# Patient Record
Sex: MALE | Race: OTHER | URBAN - METROPOLITAN AREA
[De-identification: names, ages, dates, MRNs, and addresses within clinical notes are randomized per-mention and may not be internally consistent; named-entity substitution may affect disease eponyms.]

---

## 2023-04-02 ENCOUNTER — EMERGENCY (EMERGENCY)
Facility: HOSPITAL | Age: 23
LOS: 0 days | Discharge: HOME | End: 2023-04-02
Attending: EMERGENCY MEDICINE
Payer: MEDICAID

## 2023-04-02 VITALS
WEIGHT: 176.81 LBS | HEART RATE: 67 BPM | RESPIRATION RATE: 18 BRPM | TEMPERATURE: 99 F | OXYGEN SATURATION: 99 % | SYSTOLIC BLOOD PRESSURE: 124 MMHG | DIASTOLIC BLOOD PRESSURE: 59 MMHG

## 2023-04-02 PROCEDURE — 71046 X-RAY EXAM CHEST 2 VIEWS: CPT | Mod: 26

## 2023-04-02 PROCEDURE — 99284 EMERGENCY DEPT VISIT MOD MDM: CPT

## 2023-04-02 PROCEDURE — 93010 ELECTROCARDIOGRAM REPORT: CPT

## 2023-04-02 PROCEDURE — 93005 ELECTROCARDIOGRAM TRACING: CPT

## 2023-04-02 PROCEDURE — 99283 EMERGENCY DEPT VISIT LOW MDM: CPT | Mod: 25

## 2023-04-02 PROCEDURE — 71046 X-RAY EXAM CHEST 2 VIEWS: CPT

## 2023-04-02 RX ORDER — IBUPROFEN 200 MG
600 TABLET ORAL ONCE
Refills: 0 | Status: COMPLETED | OUTPATIENT
Start: 2023-04-02 | End: 2023-04-02

## 2023-04-02 RX ADMIN — Medication 600 MILLIGRAM(S): at 14:02

## 2023-04-02 NOTE — ED PROVIDER NOTE - PHYSICAL EXAMINATION
_  Vital signs reviewed; ABCs intact  GENERAL: Well nourished, comfortable  SKIN: Warm, dry  HEAD & NECK: NCAT, supple neck  EYES: EOMI, PER B/L  ENT: MMM  CARD: RRR, S1, S2; no murmurs, no rubs, no gallops  RESP: Normal respiratory effort, CTAB, no rales, no wheezing  EXT: Pulses palpable distally; no edema; no calf tenderness; symmetrical calf circumferences  NEUROMSK: Grossly intact  PSYCH: AAOx3, cooperative, appropriate

## 2023-04-02 NOTE — ED PROVIDER NOTE - OBJECTIVE STATEMENT
Patient is a 22-year-old man without any reported, significant past medical history, presenting for chest pain for several months. Pain is on the left anterior chest next to the sternum, occurring in for the past several months, somewhat worse over the last few weeks. Described as may be pressure. Non-exertional; no SOB, diaphoresis, nausea/vomiting, radiation to jaw/left shoulder/arms, reproducible with palpation; no family history of significant cardiac disease or sudden unexplained deaths. Non-pleuritic; no recent surgery, personal or family h/o VTE, hemoptysis, malignancy, or hormone use. Not tearing/ripping, radiation to back, FND. No retching, chronic alcohol use, or recent endoscopy. No fever, IVDU.

## 2023-04-02 NOTE — ED PROVIDER NOTE - NSFOLLOWUPINSTRUCTIONS_ED_ALL_ED_FT
For pain, you may take the following over-the-counter medication(s):  - Acetaminophen (Tylenol, Midol) 650-1000 mg up to every 4-6 hours, as needed (max 4000mg/24hrs), AND/OR  - Ibuprofen (Motrin, Advil) 400-800 mg up to every 6-8 hours, as needed (max 3200mg/24hrs) You were evaluated in the Emergency Department today, and your visit did not reveal anything immediately life-threatening.    However, it is important that you follow-up with your PRIMARY CARE PHYSICIAN within 3-5 days.    For pain, you may take the following over-the-counter medication(s):  - Acetaminophen (Tylenol, Midol) 650-1000 mg up to every 4-6 hours, as needed (max 4000mg/24hrs), AND/OR  - Ibuprofen (Motrin, Advil) 400-800 mg up to every 6-8 hours, as needed (max 3200mg/24hrs)    ---------------------------------------------------------------------------------------------------    Chest Pain    Chest pain can be caused by many different conditions which may or may not be dangerous. Causes include heartburn, lung infections, heart attack, blood clot in lungs, skin infections, strain or damage to muscle, cartilage, or bones, etc. In addition to a history and physical examination, an electrocardiogram (ECG) or other lab tests may have been performed to determine the cause of your chest pain. Follow up with your primary care provider or with a cardiologist as instructed.     SEEK IMMEDIATE MEDICAL CARE IF YOU HAVE ANY OF THE FOLLOWING SYMPTOMS: worsening chest pain, coughing up blood, unexplained back/neck/jaw pain, severe abdominal pain, dizziness or lightheadedness, fainting, shortness of breath, sweaty or clammy skin, vomiting, or racing heart beat. These symptoms may represent a serious problem that is an emergency. Do not wait to see if the symptoms will go away. Get medical help right away. Call 911 and do not drive yourself to the hospital.

## 2023-04-02 NOTE — ED PROVIDER NOTE - ATTENDING CONTRIBUTION TO CARE
Musculoskeletal chest wall pain.  For several months.  Worse over the last few weeks.  Nonexertional.  No associated shortness of breath or diaphoresis.  No fever.  No vomiting.    Exam as noted.  Vital stable.  Lungs clear.  Abdomen soft.  No calf tenderness.    Chest x-ray ordered and reviewed by me.  NSAIDs.  DC with follow-up.

## 2023-04-02 NOTE — ED PROVIDER NOTE - PATIENT PORTAL LINK FT
You can access the FollowMyHealth Patient Portal offered by [facility] by registering at the following website: [email or web address]. By joining [employer]’s FollowMyHealth portal, you will also be able to view your health information using other applications (apps) compatible with our system.

## 2023-04-03 DIAGNOSIS — R07.89 OTHER CHEST PAIN: ICD-10-CM

## 2024-08-29 ENCOUNTER — INPATIENT (INPATIENT)
Facility: HOSPITAL | Age: 24
LOS: 7 days | Discharge: HOME HEALTH | DRG: 204 | End: 2024-09-06
Attending: STUDENT IN AN ORGANIZED HEALTH CARE EDUCATION/TRAINING PROGRAM | Admitting: STUDENT IN AN ORGANIZED HEALTH CARE EDUCATION/TRAINING PROGRAM
Payer: MEDICAID

## 2024-08-29 VITALS
RESPIRATION RATE: 18 BRPM | HEIGHT: 70 IN | SYSTOLIC BLOOD PRESSURE: 91 MMHG | TEMPERATURE: 98 F | DIASTOLIC BLOOD PRESSURE: 57 MMHG | OXYGEN SATURATION: 98 % | HEART RATE: 48 BPM | WEIGHT: 138.01 LBS

## 2024-08-29 DIAGNOSIS — Z82.41 FAMILY HISTORY OF SUDDEN CARDIAC DEATH: ICD-10-CM

## 2024-08-29 DIAGNOSIS — I51.89 OTHER ILL-DEFINED HEART DISEASES: ICD-10-CM

## 2024-08-29 DIAGNOSIS — E83.42 HYPOMAGNESEMIA: ICD-10-CM

## 2024-08-29 DIAGNOSIS — R00.1 BRADYCARDIA, UNSPECIFIED: ICD-10-CM

## 2024-08-29 DIAGNOSIS — Z20.89 CONTACT WITH AND (SUSPECTED) EXPOSURE TO OTHER COMMUNICABLE DISEASES: ICD-10-CM

## 2024-08-29 DIAGNOSIS — E86.0 DEHYDRATION: ICD-10-CM

## 2024-08-29 DIAGNOSIS — I34.81 NONRHEUMATIC MITRAL (VALVE) ANNULUS CALCIFICATION: ICD-10-CM

## 2024-08-29 DIAGNOSIS — R55 SYNCOPE AND COLLAPSE: ICD-10-CM

## 2024-08-29 DIAGNOSIS — I42.2 OTHER HYPERTROPHIC CARDIOMYOPATHY: ICD-10-CM

## 2024-08-29 DIAGNOSIS — I34.1 NONRHEUMATIC MITRAL (VALVE) PROLAPSE: ICD-10-CM

## 2024-08-29 DIAGNOSIS — I42.8 OTHER CARDIOMYOPATHIES: ICD-10-CM

## 2024-08-29 DIAGNOSIS — I95.9 HYPOTENSION, UNSPECIFIED: ICD-10-CM

## 2024-08-29 DIAGNOSIS — Z86.74 PERSONAL HISTORY OF SUDDEN CARDIAC ARREST: ICD-10-CM

## 2024-08-29 DIAGNOSIS — M62.838 OTHER MUSCLE SPASM: ICD-10-CM

## 2024-08-29 LAB
ALBUMIN SERPL ELPH-MCNC: 4.5 G/DL — SIGNIFICANT CHANGE UP (ref 3.5–5.2)
ALP SERPL-CCNC: 108 U/L — SIGNIFICANT CHANGE UP (ref 30–115)
ALT FLD-CCNC: 29 U/L — SIGNIFICANT CHANGE UP (ref 0–41)
ANION GAP SERPL CALC-SCNC: 9 MMOL/L — SIGNIFICANT CHANGE UP (ref 7–14)
AST SERPL-CCNC: 17 U/L — SIGNIFICANT CHANGE UP (ref 0–41)
BASOPHILS # BLD AUTO: 0.03 K/UL — SIGNIFICANT CHANGE UP (ref 0–0.2)
BASOPHILS NFR BLD AUTO: 0.5 % — SIGNIFICANT CHANGE UP (ref 0–1)
BILIRUB SERPL-MCNC: 0.5 MG/DL — SIGNIFICANT CHANGE UP (ref 0.2–1.2)
BUN SERPL-MCNC: 12 MG/DL — SIGNIFICANT CHANGE UP (ref 10–20)
CALCIUM SERPL-MCNC: 9.8 MG/DL — SIGNIFICANT CHANGE UP (ref 8.4–10.4)
CHLORIDE SERPL-SCNC: 103 MMOL/L — SIGNIFICANT CHANGE UP (ref 98–110)
CO2 SERPL-SCNC: 30 MMOL/L — SIGNIFICANT CHANGE UP (ref 17–32)
CREAT SERPL-MCNC: 1 MG/DL — SIGNIFICANT CHANGE UP (ref 0.7–1.5)
EGFR: 109 ML/MIN/1.73M2 — SIGNIFICANT CHANGE UP
EOSINOPHIL # BLD AUTO: 0.14 K/UL — SIGNIFICANT CHANGE UP (ref 0–0.7)
EOSINOPHIL NFR BLD AUTO: 2.4 % — SIGNIFICANT CHANGE UP (ref 0–8)
GLUCOSE SERPL-MCNC: 101 MG/DL — HIGH (ref 70–99)
HCT VFR BLD CALC: 39.3 % — LOW (ref 42–52)
HGB BLD-MCNC: 12.6 G/DL — LOW (ref 14–18)
IMM GRANULOCYTES NFR BLD AUTO: 0.2 % — SIGNIFICANT CHANGE UP (ref 0.1–0.3)
LYMPHOCYTES # BLD AUTO: 1.4 K/UL — SIGNIFICANT CHANGE UP (ref 1.2–3.4)
LYMPHOCYTES # BLD AUTO: 24.3 % — SIGNIFICANT CHANGE UP (ref 20.5–51.1)
MAGNESIUM SERPL-MCNC: 1.8 MG/DL — SIGNIFICANT CHANGE UP (ref 1.8–2.4)
MCHC RBC-ENTMCNC: 30.7 PG — SIGNIFICANT CHANGE UP (ref 27–31)
MCHC RBC-ENTMCNC: 32.1 G/DL — SIGNIFICANT CHANGE UP (ref 32–37)
MCV RBC AUTO: 95.6 FL — HIGH (ref 80–94)
MONOCYTES # BLD AUTO: 0.4 K/UL — SIGNIFICANT CHANGE UP (ref 0.1–0.6)
MONOCYTES NFR BLD AUTO: 6.9 % — SIGNIFICANT CHANGE UP (ref 1.7–9.3)
NEUTROPHILS # BLD AUTO: 3.78 K/UL — SIGNIFICANT CHANGE UP (ref 1.4–6.5)
NEUTROPHILS NFR BLD AUTO: 65.7 % — SIGNIFICANT CHANGE UP (ref 42.2–75.2)
NRBC # BLD: 0 /100 WBCS — SIGNIFICANT CHANGE UP (ref 0–0)
NT-PROBNP SERPL-SCNC: 86 PG/ML — SIGNIFICANT CHANGE UP (ref 0–300)
PLATELET # BLD AUTO: 243 K/UL — SIGNIFICANT CHANGE UP (ref 130–400)
PMV BLD: 9.8 FL — SIGNIFICANT CHANGE UP (ref 7.4–10.4)
POTASSIUM SERPL-MCNC: 4.5 MMOL/L — SIGNIFICANT CHANGE UP (ref 3.5–5)
POTASSIUM SERPL-SCNC: 4.5 MMOL/L — SIGNIFICANT CHANGE UP (ref 3.5–5)
PROT SERPL-MCNC: 7 G/DL — SIGNIFICANT CHANGE UP (ref 6–8)
RBC # BLD: 4.11 M/UL — LOW (ref 4.7–6.1)
RBC # FLD: 13.2 % — SIGNIFICANT CHANGE UP (ref 11.5–14.5)
SODIUM SERPL-SCNC: 142 MMOL/L — SIGNIFICANT CHANGE UP (ref 135–146)
TROPONIN T, HIGH SENSITIVITY RESULT: 11 NG/L — SIGNIFICANT CHANGE UP (ref 6–21)
TROPONIN T, HIGH SENSITIVITY RESULT: 13 NG/L — SIGNIFICANT CHANGE UP (ref 6–21)
WBC # BLD: 5.76 K/UL — SIGNIFICANT CHANGE UP (ref 4.8–10.8)
WBC # FLD AUTO: 5.76 K/UL — SIGNIFICANT CHANGE UP (ref 4.8–10.8)

## 2024-08-29 PROCEDURE — 99291 CRITICAL CARE FIRST HOUR: CPT

## 2024-08-29 PROCEDURE — 95714 VEEG EA 12-26 HR UNMNTR: CPT

## 2024-08-29 PROCEDURE — 93621 COMP EP EVL L PAC&REC C SINS: CPT

## 2024-08-29 PROCEDURE — 85027 COMPLETE CBC AUTOMATED: CPT

## 2024-08-29 PROCEDURE — C1777: CPT

## 2024-08-29 PROCEDURE — 86901 BLOOD TYPING SEROLOGIC RH(D): CPT

## 2024-08-29 PROCEDURE — 95819 EEG AWAKE AND ASLEEP: CPT

## 2024-08-29 PROCEDURE — 36415 COLL VENOUS BLD VENIPUNCTURE: CPT

## 2024-08-29 PROCEDURE — 84443 ASSAY THYROID STIM HORMONE: CPT

## 2024-08-29 PROCEDURE — 93620 COMP EP EVL R AT VEN PAC&REC: CPT

## 2024-08-29 PROCEDURE — 97162 PT EVAL MOD COMPLEX 30 MIN: CPT | Mod: GP

## 2024-08-29 PROCEDURE — 97110 THERAPEUTIC EXERCISES: CPT | Mod: GP

## 2024-08-29 PROCEDURE — 95700 EEG CONT REC W/VID EEG TECH: CPT

## 2024-08-29 PROCEDURE — 71045 X-RAY EXAM CHEST 1 VIEW: CPT

## 2024-08-29 PROCEDURE — C1721: CPT

## 2024-08-29 PROCEDURE — 75561 CARDIAC MRI FOR MORPH W/DYE: CPT

## 2024-08-29 PROCEDURE — 83735 ASSAY OF MAGNESIUM: CPT

## 2024-08-29 PROCEDURE — 97116 GAIT TRAINING THERAPY: CPT | Mod: GP

## 2024-08-29 PROCEDURE — 33249 INSJ/RPLCMT DEFIB W/LEAD(S): CPT

## 2024-08-29 PROCEDURE — 93010 ELECTROCARDIOGRAM REPORT: CPT

## 2024-08-29 PROCEDURE — 80307 DRUG TEST PRSMV CHEM ANLYZR: CPT

## 2024-08-29 PROCEDURE — 86850 RBC ANTIBODY SCREEN: CPT

## 2024-08-29 PROCEDURE — 93005 ELECTROCARDIOGRAM TRACING: CPT

## 2024-08-29 PROCEDURE — C1889: CPT

## 2024-08-29 PROCEDURE — C1769: CPT

## 2024-08-29 PROCEDURE — 93287 PERI-PX DEVICE EVAL & PRGR: CPT

## 2024-08-29 PROCEDURE — 86900 BLOOD TYPING SEROLOGIC ABO: CPT

## 2024-08-29 PROCEDURE — C1730: CPT

## 2024-08-29 PROCEDURE — C1766: CPT

## 2024-08-29 PROCEDURE — 71275 CT ANGIOGRAPHY CHEST: CPT | Mod: 26,MC

## 2024-08-29 PROCEDURE — 85610 PROTHROMBIN TIME: CPT

## 2024-08-29 PROCEDURE — 71046 X-RAY EXAM CHEST 2 VIEWS: CPT

## 2024-08-29 PROCEDURE — C1892: CPT

## 2024-08-29 PROCEDURE — 97530 THERAPEUTIC ACTIVITIES: CPT | Mod: GP

## 2024-08-29 PROCEDURE — C1894: CPT

## 2024-08-29 PROCEDURE — A9579: CPT

## 2024-08-29 PROCEDURE — 93623 PRGRMD STIMJ&PACG IV RX NFS: CPT

## 2024-08-29 PROCEDURE — 85730 THROMBOPLASTIN TIME PARTIAL: CPT

## 2024-08-29 PROCEDURE — 71046 X-RAY EXAM CHEST 2 VIEWS: CPT | Mod: 26

## 2024-08-29 PROCEDURE — 84439 ASSAY OF FREE THYROXINE: CPT

## 2024-08-29 PROCEDURE — 97166 OT EVAL MOD COMPLEX 45 MIN: CPT | Mod: GO

## 2024-08-29 PROCEDURE — 80048 BASIC METABOLIC PNL TOTAL CA: CPT

## 2024-08-29 PROCEDURE — 93306 TTE W/DOPPLER COMPLETE: CPT

## 2024-08-29 PROCEDURE — 85025 COMPLETE CBC W/AUTO DIFF WBC: CPT

## 2024-08-29 RX ORDER — SODIUM CHLORIDE 9 MG/ML
1000 INJECTION INTRAMUSCULAR; INTRAVENOUS; SUBCUTANEOUS ONCE
Refills: 0 | Status: COMPLETED | OUTPATIENT
Start: 2024-08-29 | End: 2024-08-29

## 2024-08-29 RX ORDER — METOPROLOL TARTRATE 100 MG/1
25 TABLET ORAL DAILY
Refills: 0 | Status: DISCONTINUED | OUTPATIENT
Start: 2024-08-29 | End: 2024-08-30

## 2024-08-29 RX ADMIN — SODIUM CHLORIDE 1000 MILLILITER(S): 9 INJECTION INTRAMUSCULAR; INTRAVENOUS; SUBCUTANEOUS at 22:59

## 2024-08-29 RX ADMIN — SODIUM CHLORIDE 1000 MILLILITER(S): 9 INJECTION INTRAMUSCULAR; INTRAVENOUS; SUBCUTANEOUS at 21:24

## 2024-08-29 NOTE — ED PROVIDER NOTE - OBJECTIVE STATEMENT
24-year-old male past medical history of HCOM?,  Cardiac arrest in June 2024 presents to the ED for evaluation of syncope.  Patient with syncopal episode at home patient was sitting down and then remembers waking up with blurry vision.  Witnessed by mother patient called patient as he was falling forward off of his chair.  Patient was unconscious for approximately 1 minute came to was back to baseline immediately.  Some shaking was noted however no postictal period, no tongue biting no urinary incontinence.  Patient has had episodes like these over the past several years.  Admits to family history of sudden cardiac death father passed away in his early 30s.  Following with cardiologist Dr. Becker in [place].  There was talk of AICD placement versus LifeVest however patient currently does not have either.  Patient currently without any complaints denies any recent illness no cough, fever, chills, nausea, vomiting, headache, chest pain, shortness of breath, abdominal pain.

## 2024-08-29 NOTE — ED ADULT NURSE NOTE - CHIEF COMPLAINT QUOTE
pt biba from home after c/o blurry vision and dizziness followed by a syncopal episode followed by seizure like activity witnessed by mom -pt has previously had a seizure in the past few yrs ago - hx of MI  in june and taken off ventilator and discharged to home two weeks ago.  RAC 18g - 350mlof  500 ml bolus administered by ems. FS  99

## 2024-08-29 NOTE — ED ADULT TRIAGE NOTE - CHIEF COMPLAINT QUOTE
pt biba from home after syncopal episode followed by seizure like activity witnessed by mom -pt has previously had a seizure in the past few yrs ago - hx of MI  in june and taken off ventilator and discharged to home two weeks ago.  RAC 18g - 350mlof  500 ml bolus administered by ems pt biba from home after c/o blurry vision and dizziness followed by a syncopal episode followed by seizure like activity witnessed by mom -pt has previously had a seizure in the past few yrs ago - hx of MI  in june and taken off ventilator and discharged to home two weeks ago.  RAC 18g - 350mlof  500 ml bolus administered by ems. FS  99

## 2024-08-29 NOTE — ED PROVIDER NOTE - CRITICAL CARE ATTENDING CONTRIBUTION TO CARE
I personally saw the patient. ANIYAH De Leon and I provided critical care for a total of  35 minutes. I provided a substantive portion of the care and the majority of the critical care time.

## 2024-08-29 NOTE — ED PROVIDER NOTE - CLINICAL SUMMARY MEDICAL DECISION MAKING FREE TEXT BOX
Patient with past medical history of cardiac arrest presents with syncopal episode family history of sudden cardiac arrest patient does not have AICD or LifeVest no chest pain no shortness of breath    Discussed with cardiology will admit for cardiology    Independently interpretted by Rudy Olson DO:  XR interpretation: No cardiopulmonary disease,   EKG interpretation: no MIKEL, NSR    Appropriate medications for patient's presenting complaints were ordered and effects were reassessed.  Patient's external records were reviewed. Additional history was obtained from.    Escalation to admission/observation was considered.  At this time, patient requires inpatient hospitalization .

## 2024-08-30 ENCOUNTER — RESULT REVIEW (OUTPATIENT)
Age: 24
End: 2024-08-30

## 2024-08-30 LAB
ANION GAP SERPL CALC-SCNC: 13 MMOL/L — SIGNIFICANT CHANGE UP (ref 7–14)
APTT BLD: 31.5 SEC — SIGNIFICANT CHANGE UP (ref 27–39.2)
BASOPHILS # BLD AUTO: 0.03 K/UL — SIGNIFICANT CHANGE UP (ref 0–0.2)
BASOPHILS NFR BLD AUTO: 0.4 % — SIGNIFICANT CHANGE UP (ref 0–1)
BUN SERPL-MCNC: 11 MG/DL — SIGNIFICANT CHANGE UP (ref 10–20)
CALCIUM SERPL-MCNC: 9.3 MG/DL — SIGNIFICANT CHANGE UP (ref 8.4–10.4)
CHLORIDE SERPL-SCNC: 102 MMOL/L — SIGNIFICANT CHANGE UP (ref 98–110)
CO2 SERPL-SCNC: 25 MMOL/L — SIGNIFICANT CHANGE UP (ref 17–32)
CREAT SERPL-MCNC: 0.9 MG/DL — SIGNIFICANT CHANGE UP (ref 0.7–1.5)
EGFR: 124 ML/MIN/1.73M2 — SIGNIFICANT CHANGE UP
EOSINOPHIL # BLD AUTO: 0.18 K/UL — SIGNIFICANT CHANGE UP (ref 0–0.7)
EOSINOPHIL NFR BLD AUTO: 2.5 % — SIGNIFICANT CHANGE UP (ref 0–8)
GLUCOSE SERPL-MCNC: 79 MG/DL — SIGNIFICANT CHANGE UP (ref 70–99)
HCT VFR BLD CALC: 35.9 % — LOW (ref 42–52)
HGB BLD-MCNC: 11.6 G/DL — LOW (ref 14–18)
IMM GRANULOCYTES NFR BLD AUTO: 0.3 % — SIGNIFICANT CHANGE UP (ref 0.1–0.3)
INR BLD: 1.1 RATIO — SIGNIFICANT CHANGE UP (ref 0.65–1.3)
LYMPHOCYTES # BLD AUTO: 2.05 K/UL — SIGNIFICANT CHANGE UP (ref 1.2–3.4)
LYMPHOCYTES # BLD AUTO: 28.3 % — SIGNIFICANT CHANGE UP (ref 20.5–51.1)
MAGNESIUM SERPL-MCNC: 1.7 MG/DL — LOW (ref 1.8–2.4)
MCHC RBC-ENTMCNC: 30.4 PG — SIGNIFICANT CHANGE UP (ref 27–31)
MCHC RBC-ENTMCNC: 32.3 G/DL — SIGNIFICANT CHANGE UP (ref 32–37)
MCV RBC AUTO: 94.2 FL — HIGH (ref 80–94)
MONOCYTES # BLD AUTO: 0.53 K/UL — SIGNIFICANT CHANGE UP (ref 0.1–0.6)
MONOCYTES NFR BLD AUTO: 7.3 % — SIGNIFICANT CHANGE UP (ref 1.7–9.3)
NEUTROPHILS # BLD AUTO: 4.44 K/UL — SIGNIFICANT CHANGE UP (ref 1.4–6.5)
NEUTROPHILS NFR BLD AUTO: 61.2 % — SIGNIFICANT CHANGE UP (ref 42.2–75.2)
NRBC # BLD: 0 /100 WBCS — SIGNIFICANT CHANGE UP (ref 0–0)
PLATELET # BLD AUTO: 234 K/UL — SIGNIFICANT CHANGE UP (ref 130–400)
PMV BLD: 10 FL — SIGNIFICANT CHANGE UP (ref 7.4–10.4)
POTASSIUM SERPL-MCNC: 4.1 MMOL/L — SIGNIFICANT CHANGE UP (ref 3.5–5)
POTASSIUM SERPL-SCNC: 4.1 MMOL/L — SIGNIFICANT CHANGE UP (ref 3.5–5)
PROTHROM AB SERPL-ACNC: 12.6 SEC — SIGNIFICANT CHANGE UP (ref 9.95–12.87)
RBC # BLD: 3.81 M/UL — LOW (ref 4.7–6.1)
RBC # FLD: 13.1 % — SIGNIFICANT CHANGE UP (ref 11.5–14.5)
SODIUM SERPL-SCNC: 140 MMOL/L — SIGNIFICANT CHANGE UP (ref 135–146)
WBC # BLD: 7.25 K/UL — SIGNIFICANT CHANGE UP (ref 4.8–10.8)
WBC # FLD AUTO: 7.25 K/UL — SIGNIFICANT CHANGE UP (ref 4.8–10.8)

## 2024-08-30 PROCEDURE — 93010 ELECTROCARDIOGRAM REPORT: CPT

## 2024-08-30 PROCEDURE — 99223 1ST HOSP IP/OBS HIGH 75: CPT

## 2024-08-30 PROCEDURE — 93306 TTE W/DOPPLER COMPLETE: CPT | Mod: 26

## 2024-08-30 RX ORDER — AMANTADINE HCL 100 MG
100 CAPSULE ORAL
Refills: 0 | Status: DISCONTINUED | OUTPATIENT
Start: 2024-08-30 | End: 2024-08-30

## 2024-08-30 RX ORDER — SODIUM CHLORIDE 9 MG/ML
1000 INJECTION INTRAMUSCULAR; INTRAVENOUS; SUBCUTANEOUS
Refills: 0 | Status: DISCONTINUED | OUTPATIENT
Start: 2024-08-30 | End: 2024-08-31

## 2024-08-30 RX ORDER — METOPROLOL TARTRATE 100 MG/1
25 TABLET ORAL
Refills: 0 | Status: DISCONTINUED | OUTPATIENT
Start: 2024-08-30 | End: 2024-08-30

## 2024-08-30 RX ORDER — GABAPENTIN 100 MG
300 CAPSULE ORAL THREE TIMES A DAY
Refills: 0 | Status: DISCONTINUED | OUTPATIENT
Start: 2024-08-30 | End: 2024-08-30

## 2024-08-30 RX ORDER — ASPIRIN 81 MG
81 TABLET, DELAYED RELEASE (ENTERIC COATED) ORAL DAILY
Refills: 0 | Status: DISCONTINUED | OUTPATIENT
Start: 2024-08-30 | End: 2024-09-06

## 2024-08-30 RX ORDER — ASPIRIN 81 MG
0 TABLET, DELAYED RELEASE (ENTERIC COATED) ORAL
Refills: 0 | DISCHARGE

## 2024-08-30 RX ORDER — ACETAMINOPHEN 325 MG/1
650 TABLET ORAL ONCE
Refills: 0 | Status: COMPLETED | OUTPATIENT
Start: 2024-08-30 | End: 2024-08-30

## 2024-08-30 RX ORDER — ACETAMINOPHEN 325 MG/1
650 TABLET ORAL EVERY 6 HOURS
Refills: 0 | Status: DISCONTINUED | OUTPATIENT
Start: 2024-08-30 | End: 2024-09-06

## 2024-08-30 RX ORDER — HEPARIN SODIUM,BOVINE 1000/ML
5000 VIAL (ML) INJECTION EVERY 12 HOURS
Refills: 0 | Status: DISCONTINUED | OUTPATIENT
Start: 2024-08-30 | End: 2024-08-31

## 2024-08-30 RX ADMIN — SODIUM CHLORIDE 75 MILLILITER(S): 9 INJECTION INTRAMUSCULAR; INTRAVENOUS; SUBCUTANEOUS at 05:58

## 2024-08-30 RX ADMIN — Medication 300 MILLIGRAM(S): at 06:13

## 2024-08-30 RX ADMIN — ACETAMINOPHEN 650 MILLIGRAM(S): 325 TABLET ORAL at 06:13

## 2024-08-30 RX ADMIN — Medication 100 MILLIGRAM(S): at 06:13

## 2024-08-30 RX ADMIN — Medication 81 MILLIGRAM(S): at 11:33

## 2024-08-30 RX ADMIN — Medication 5000 UNIT(S): at 17:12

## 2024-08-30 RX ADMIN — METOPROLOL TARTRATE 25 MILLIGRAM(S): 100 TABLET ORAL at 01:04

## 2024-08-30 RX ADMIN — ACETAMINOPHEN 650 MILLIGRAM(S): 325 TABLET ORAL at 06:43

## 2024-08-30 RX ADMIN — Medication 25 GRAM(S): at 13:25

## 2024-08-30 NOTE — H&P ADULT - ASSESSMENT
24-year-old male past medical history of HCOM?,  Cardiac arrest in June 2024 prompting around a month hospital stay  at [facility] s/p being on ventillar discharged recently from LTAC in [place], presents to the ED for evaluation of syncope today, denies any post ictal or prodromal symptoms, hypotensive on arrival.  s/p 500 cc with EMS , and BP on arrival  91/57 mm, and he received 1 L NS in the ED with improvement in BP to 105/70. During admission, patient resting comfortably in stretcher in ED, at baseline mental status, denies any chest pain, palpitations, lightheadedness, dizziness, blurry vision. Patient admitted to cardiac tele for further management of Syncope.       #Syncope- likely due to Dehydration, ?cardiogenic  #?HOCM  - Monitor on tele   - s/p 1 L NS   - avoid dehydration, continue NS 75 cc/hr   - obtain prior records from [facility] in the AM  - TTE ordered   - Pt takes Toprol 25 mg bid at home, currently HR 53-55, re-evaluate based on HR in the AM , pt received one dose in the ED   - continue Aspirin 81 mg daily   - consider EP consult     #Recent cardiac arrest   - on amantidine 100 mg bid and gabapentin 300 mg tid as per mother     #DVT ppx  - Early ambulation               24-year-old male past medical history of Cardiac arrest in June 2024 prompting around a month hospital stay  at [facility] s/p being on ventillar discharged recently from LTAC in [place], presents to the ED for evaluation of syncope today, denies any post ictal or prodromal symptoms, hypotensive on arrival.  s/p 500 cc with EMS , and BP on arrival  91/57 mm, and he received 1 L NS in the ED with improvement in BP to 105/70. During admission, patient resting comfortably in stretcher in ED, at baseline mental status, denies any chest pain, palpitations, lightheadedness, dizziness, blurry vision. Patient admitted to cardiac tele for further management of Syncope.       #Syncope- likely due to Dehydration, ?cardiogenic  #?HOCM  - Monitor on tele   - s/p 1 L NS   - avoid dehydration, continue NS 75 cc/hr   - obtain prior records from [facility] in the AM  - TTE ordered   - Pt takes Toprol 25 mg bid at home, currently HR 53-55, re-evaluate based on HR in the AM , pt received one dose in the ED   - continue Aspirin 81 mg daily   - consider EP consult     #Recent cardiac arrest   - on amantidine 100 mg bid and gabapentin 300 mg tid as per mother     #DVT ppx  - Early ambulation

## 2024-08-30 NOTE — H&P ADULT - NS ATTEND AMEND GEN_ALL_CORE FT
24yoM with FHx of father who  in his sleep at age 33 (no autopsy or genetic testing) and PMHx of cardiac arrest requiring ~30 minutes of chest compressions and month-long hospitalization at [facility] who presents with syncope.     Per the patient's mother, patient's father had no known medical conditions. On the day of his death, he was feeling unwell and going to the bathroom frequently overnight. He called out sick, which he normally does not do. In the morning, she got up and sent her son/patient off to school. When she returned to the bedroom, her /patient's father was blue and cold in bed. Per the patient's mother, she does not know of any cardiac conditions or syncope/arrest in her late 's family.     Mr. Diaz has had 3 syncopal episodes in his life. Once in high school when he came out of a sauna. Another "on his birthday", when he was urinating while standing and his mother found him unconscious on the bathroom floor. And yesterday's episode. Patient was sitting down with a glass of water, when he passed out and spilled the water. His mother caught him falling in his chair, saying he was diaphoretic, clammy, and pale. Episode lasted a few seconds, up to a minute.     As per above, patient with recent history of cardiac arrest and now follows with Dr. Becker in [place]. Reportedly had "normal plumbing," an EP study, and a LifeVest was recommended but not covered by insurance.     On my review of the TTE, there is right ventricular dilitation and hypertrophy, no other significant findings. The ostium of his left main is located at the LCC.    Plan:   - On ASA 81 mg daily, unclear reason but will continue until OSH obtained  - Discontinue gabapentin and amantadine, which was prescribed for post-arrest muscle spasms while at [facility]  - CMR for infiltrative disease and rule out of ARVD  - Sending a Release of Medical Records to [facility] and other institution where patient has been cared for  - Trop: 13 --> 11  - ECG 3024: Sinus bradycardia with LVH, high voltage QRS in the lateral precordial leads, normal QTc  - TTE 24: LVEF 62%, normal LV size and wall thickness, RV is mildly enlarged, RVH, no valvular abnormalities  - Will not order CCTA at this time because LM is visible off LCC, awaiting OSH records  - Consult EP for ILR and LifeVest versus ICD placement

## 2024-08-30 NOTE — PROGRESS NOTE ADULT - SUBJECTIVE AND OBJECTIVE BOX
Chief complaint: Patient is a 24y old  Male who presents with a chief complaint of Syncope (30 Aug 2024 01:53)    Interval history: Patient seen and examined this AM. Patient tired on exam. Accompanied by mother.    Review of systems: A complete 10-point review of systems was obtained and is negative except as stated in the interval history.    Vitals:  T(F): 98.1, Max: 98.5 (08-29 @ 23:04)  HR: 64 (48 - 81)  BP: 115/71 (91/57 - 119/60)  RR: 18 (18 - 18)  SpO2: 100% (98% - 100%)    Ins & outs:     08-30 @ 07:01  -  08-30 @ 10:26  --------------------------------------------------------  IN: 300 mL / OUT: 0 mL / NET: 300 mL      Weight trend:  Weight (kg): 65.3 (08-30)    Physical exam:  General: No apparent distress  HEENT: Anicteric sclera. Moist mucous membranes. JVP *** cm.   Cardiac: Regular rate and rhythm. No murmurs, rubs, or gallops.   Vascular: Symmetric radial pulses. Dorsalis pedis pulses palpable.   Respiratory: Normal effort. Clear to ascultation.   Abdomen: Soft, nontender. Audible bowel sounds.   Extremities: Warm with no edema. No cyanosis or clubbing.   Skin: Warm and dry. No rash.   Neurologic: Grossly normal motor function.   Psychiatric: Oriented to person, place, and time.     Data reviewed:  - Telemetry: NS 49-67bpm    - ECG (8/30/24):   Diagnosis Line Sinus bradycardia  Voltage criteria for left ventricular hypertrophy  ST elevation, consider early repolarization  Abnormal ECG    - TTE: Pending    - Chest x-ray (8/29/24):   Impression:  No radiographic evidence of acute cardiopulmonary disease.      - Labs:                        11.6   7.25  )-----------( 234      ( 30 Aug 2024 07:39 )             35.9     08-29    142  |  103  |  12  ----------------------------<  101<H>  4.5   |  30  |  1.0    Ca    9.8      29 Aug 2024 18:50  Mg     1.8     08-29    TPro  7.0  /  Alb  4.5  /  TBili  0.5  /  DBili  x   /  AST  17  /  ALT  29  /  AlkPhos  108  08-29    PT/INR - ( 30 Aug 2024 07:39 )   PT: 12.60 sec;   INR: 1.10 ratio         PTT - ( 30 Aug 2024 07:39 )  PTT:31.5 sec            Urinalysis Basic - ( 29 Aug 2024 18:50 )    Color: x / Appearance: x / SG: x / pH: x  Gluc: 101 mg/dL / Ketone: x  / Bili: x / Urobili: x   Blood: x / Protein: x / Nitrite: x   Leuk Esterase: x / RBC: x / WBC x   Sq Epi: x / Non Sq Epi: x / Bacteria: x        Medications:  amantadine 100 milliGRAM(s) Oral two times a day  aspirin enteric coated 81 milliGRAM(s) Oral daily  gabapentin 300 milliGRAM(s) Oral three times a day  heparin   Injectable 5000 Unit(s) SubCutaneous every 12 hours    Drips:  sodium chloride 0.9%. 1000 milliLiter(s) (75 mL/Hr) IV Continuous <Continuous>    PRN:     Allergies    No Known Allergies    Intolerances

## 2024-08-30 NOTE — PROGRESS NOTE ADULT - ASSESSMENT
Assessment:   24-year-old male past medical history of HCOM?,  Cardiac arrest in June 2024 prompting around a month hospital stay  at [facility] s/p being on ventillar discharged recently from LTAC in [place], presents to the ED for evaluation of syncope today, denies any post ictal or prodromal symptoms, hypotensive on arrival.  s/p 500 cc with EMS , and BP on arrival  91/57 mm, and he received 1 L NS in the ED with improvement in BP to 105/70. During admission, patient resting comfortably in stretcher in ED, at baseline mental status, denies any chest pain, palpitations, lightheadedness, dizziness, blurry vision. Patient admitted to cardiac tele for further work-up of syncope.       Problems discussed and associated plan:  #Syncope- likely due to Dehydration, ?cardiogenic  #?HOCM (+ FHx)  - Monitor on tele   - s/p 1 L NS   - avoid dehydration, continue NS 75 cc/hr   - TTE pending official read  - Pt takes Toprol 25 mg bid at home, currently HR 53-55, re-evaluate based on HR in the AM , pt received one dose in the ED   - continue Aspirin 81 mg daily   - consider EP consult     #Muscle spasms  - on amantidine 100 mg bid and gabapentin 300 mg tid for less than a month as per mother     #DVT ppx  - Early ambulation     Please contact me with any questions or concerns at [phone]. Assessment:   24-year-old male past medical history of  Cardiac arrest in June 2024 prompting around a month hospital stay  at [facility] s/p being on ventillar discharged recently from LTAC in [place], presents to the ED for evaluation of syncope today, denies any post ictal or prodromal symptoms, hypotensive on arrival.  s/p 500 cc with EMS , and BP on arrival  91/57 mm, and he received 1 L NS in the ED with improvement in BP to 105/70. During admission, patient resting comfortably in stretcher in ED, at baseline mental status, denies any chest pain, palpitations, lightheadedness, dizziness, blurry vision. Patient admitted to cardiac tele for further work-up of syncope.       Problems discussed and associated plan:  #Syncope- likely due to Dehydration, ?cardiogenic  #?HOCM (+ FHx)  - Monitor on tele   - s/p 1 L NS   - avoid dehydration, continue NS 75 cc/hr   - TTE pending official read  - Pt takes Toprol 25 mg bid at home, currently HR 53-55, re-evaluate based on HR in the AM , pt received one dose in the ED   - continue Aspirin 81 mg daily   - consider EP consult     #Muscle spasms  - on amantidine 100 mg bid and gabapentin 300 mg tid for less than a month as per mother     #DVT ppx  - Early ambulation     Please contact me with any questions or concerns at [phone]. Assessment:   24-year-old male past medical history of  Cardiac arrest in June 2024 prompting around a month hospital stay  at [facility] s/p being on ventillar discharged recently from LTAC in [place], presents to the ED for evaluation of syncope today, denies any post ictal or prodromal symptoms, hypotensive on arrival.  s/p 500 cc with EMS , and BP on arrival  91/57 mm, and he received 1 L NS in the ED with improvement in BP to 105/70. During admission, patient resting comfortably in stretcher in ED, at baseline mental status, denies any chest pain, palpitations, lightheadedness, dizziness, blurry vision. Patient admitted to cardiac tele for further work-up of syncope.       Problems discussed and associated plan:  #Syncope  - h/o cardiac arrest  - Monitor on tele   - s/p 1 L NS   - TTE pending official read  - Pt takes Toprol 25 mg bid at home, currently HR 50s  - continue Aspirin 81 mg daily   - EP consult: Wearable cardiac defibrillator, ILR, and eventual genetic testing   - Cardiac MRI pending to assess for infiltrative disease  - Obtaining records from [facility], [facility], and [facility] [place]  - F/u am ekgs    #Muscle spasms  - DCed amantidine 100 mg bid and gabapentin 300 mg tid   - monitor    #DVT ppx  - Early ambulation     Please contact me with any questions or concerns at [phone].

## 2024-08-30 NOTE — H&P ADULT - HISTORY OF PRESENT ILLNESS
24-year-old male past medical history of HCOM?,  Cardiac arrest in June 2024 prompting around a month hospital stay  at [facility] s/p being on ventillar discharged recently from [facility] in [place], presents to the ED for evaluation of syncope.    Patient accompanied by mother. Patient was at an [facility] center when he was walking around and wanted to sit somewhere, found a chair,  and asked for water. As he sat down, his mother reports he had lost consciousness and was falling forward off his chair, and caught him from falling. LOC around  few seconds, and as per mother patient appeared clammy, diaphoretic, pale and upon waking up patient was at baseline, but did c/o blurry vision. Denies any prodromal or post ictal symptoms, but as per mother she saw some shaking. Denies any tongue biting, urinary incontinence.   As per mother, patient had around a month hospital stay at [facility] post cardiac arrest, where he was told his plumbing of heart is normal , but had problems with wiring. Also reports having an EEG done showed no seizures. Patient since discharge has been following  with cardiologist Dr. Becker in [place], and since then he reports having an abnormal EP study, and was recommended a life vest but due to insurance issues he was not able to get it. Subsequently, he underwent another EP study which as per mother was "normal".   Patient during admission seen in the ED, at baseline mental status,  denies any chest pain, SOB, fever, chills, cough, sickness, n/v/d, head ache, pain, leg swelling/pain, hx of seizures.   BP on ED arrival  91/57 mm, and he received 1 L NS in the ED. prior to admission. Patient further admitted to cardiology for further management. BP on admission 105/70.       24-year-old male past medical history of Cardiac arrest in June 2024 prompting around a month hospital stay  at [facility] s/p being on ventillar discharged recently from LTAC in [place], presents to the ED for evaluation of syncope.    Patient accompanied by mother. Patient was at an [facility] center when he was walking around and wanted to sit somewhere, found a chair,  and asked for water. As he sat down, his mother reports he had lost consciousness and was falling forward off his chair, and caught him from falling. LOC around  few seconds, and as per mother patient appeared clammy, diaphoretic, pale and upon waking up patient was at baseline, but did c/o blurry vision. Denies any prodromal or post ictal symptoms, but as per mother she saw some shaking. Denies any tongue biting, urinary incontinence.   As per mother, patient had around a month hospital stay at [facility] post cardiac arrest, where he was told his plumbing of heart is normal , but had problems with wiring. Also reports having an EEG done showed no seizures. Patient since discharge has been following  with cardiologist Dr. Becker in [place], and since then he reports having an abnormal EP study, and was recommended a life vest but due to insurance issues he was not able to get it. Subsequently, he underwent another EP study which as per mother was "normal".   Patient during admission seen in the ED, at baseline mental status,  denies any chest pain, SOB, fever, chills, cough, sickness, n/v/d, head ache, pain, leg swelling/pain, hx of seizures.   BP on ED arrival  91/57 mm, and he received 1 L NS in the ED. prior to admission. Patient further admitted to cardiology for further management. BP on admission 105/70.

## 2024-08-30 NOTE — H&P ADULT - NSHPLABSRESULTS_GEN_ALL_CORE
- Labs:                        12.6   5.76  )-----------( 243      ( 29 Aug 2024 18:50 )             39.3     08-29    142  |  103  |  12  ----------------------------<  101<H>  4.5   |  30  |  1.0    Ca    9.8      29 Aug 2024 18:50  Mg     1.8     08-29    TPro  7.0  /  Alb  4.5  /  TBili  0.5  /  DBili  x   /  AST  17  /  ALT  29  /  AlkPhos  108  08-29    LIVER FUNCTIONS - ( 29 Aug 2024 18:50 )  Alb: 4.5 g/dL / Pro: 7.0 g/dL / ALK PHOS: 108 U/L / ALT: 29 U/L / AST: 17 U/L / GGT: x                     Lactate Trend    Urinalysis Basic - ( 29 Aug 2024 18:50 )    Color: x / Appearance: x / SG: x / pH: x  Gluc: 101 mg/dL / Ketone: x  / Bili: x / Urobili: x   Blood: x / Protein: x / Nitrite: x   Leuk Esterase: x / RBC: x / WBC x   Sq Epi: x / Non Sq Epi: x / Bacteria: x

## 2024-08-30 NOTE — PATIENT PROFILE ADULT - FALL HARM RISK - HARM RISK INTERVENTIONS

## 2024-08-30 NOTE — H&P ADULT - NSHPPHYSICALEXAM_GEN_ALL_CORE
VITALS:   T(C): 36.9 (08-29-24 @ 23:04), Max: 36.9 (08-29-24 @ 17:36)  HR: 58 (08-29-24 @ 23:04) (48 - 58)  BP: 105/70 (08-29-24 @ 23:04) (91/57 - 105/70)  RR: 18 (08-29-24 @ 23:04) (18 - 18)  SpO2: 98% (08-29-24 @ 23:04) (98% - 98%)    GENERAL: NAD, lying in bed comfortably  HEAD:  Atraumatic, normocephalic  EYES: EOMI, PERRLA, conjunctiva and sclera clear  ENT: Moist mucous membranes  NECK: Supple, no JVD  HEART: Regular rate and rhythm, sinus bradycardia HR 53, no murmur   LUNGS: Unlabored respirations.  Clear to auscultation bilaterally, no crackles, wheezing, or rhonchi  ABDOMEN: Soft, nontender, nondistended, +BS  EXTREMITIES: 2+ peripheral pulses bilaterally. No clubbing, cyanosis, or edema  NERVOUS SYSTEM:  A&Ox3, no focal deficits   SKIN: No rashes or lesions

## 2024-08-31 LAB
ANION GAP SERPL CALC-SCNC: 10 MMOL/L — SIGNIFICANT CHANGE UP (ref 7–14)
BUN SERPL-MCNC: 9 MG/DL — LOW (ref 10–20)
CALCIUM SERPL-MCNC: 9.3 MG/DL — SIGNIFICANT CHANGE UP (ref 8.4–10.5)
CHLORIDE SERPL-SCNC: 104 MMOL/L — SIGNIFICANT CHANGE UP (ref 98–110)
CO2 SERPL-SCNC: 26 MMOL/L — SIGNIFICANT CHANGE UP (ref 17–32)
CREAT SERPL-MCNC: 0.8 MG/DL — SIGNIFICANT CHANGE UP (ref 0.7–1.5)
EGFR: 128 ML/MIN/1.73M2 — SIGNIFICANT CHANGE UP
GLUCOSE SERPL-MCNC: 87 MG/DL — SIGNIFICANT CHANGE UP (ref 70–99)
HCT VFR BLD CALC: 37.5 % — LOW (ref 42–52)
HGB BLD-MCNC: 12.5 G/DL — LOW (ref 14–18)
MAGNESIUM SERPL-MCNC: 1.9 MG/DL — SIGNIFICANT CHANGE UP (ref 1.8–2.4)
MCHC RBC-ENTMCNC: 30.8 PG — SIGNIFICANT CHANGE UP (ref 27–31)
MCHC RBC-ENTMCNC: 33.3 G/DL — SIGNIFICANT CHANGE UP (ref 32–37)
MCV RBC AUTO: 92.4 FL — SIGNIFICANT CHANGE UP (ref 80–94)
NRBC # BLD: 0 /100 WBCS — SIGNIFICANT CHANGE UP (ref 0–0)
PLATELET # BLD AUTO: 211 K/UL — SIGNIFICANT CHANGE UP (ref 130–400)
PMV BLD: 9.6 FL — SIGNIFICANT CHANGE UP (ref 7.4–10.4)
POTASSIUM SERPL-MCNC: 3.9 MMOL/L — SIGNIFICANT CHANGE UP (ref 3.5–5)
POTASSIUM SERPL-SCNC: 3.9 MMOL/L — SIGNIFICANT CHANGE UP (ref 3.5–5)
RBC # BLD: 4.06 M/UL — LOW (ref 4.7–6.1)
RBC # FLD: 12.9 % — SIGNIFICANT CHANGE UP (ref 11.5–14.5)
SODIUM SERPL-SCNC: 140 MMOL/L — SIGNIFICANT CHANGE UP (ref 135–146)
WBC # BLD: 6.32 K/UL — SIGNIFICANT CHANGE UP (ref 4.8–10.8)
WBC # FLD AUTO: 6.32 K/UL — SIGNIFICANT CHANGE UP (ref 4.8–10.8)

## 2024-08-31 PROCEDURE — 99254 IP/OBS CNSLTJ NEW/EST MOD 60: CPT

## 2024-08-31 PROCEDURE — 99233 SBSQ HOSP IP/OBS HIGH 50: CPT

## 2024-08-31 RX ORDER — HEPARIN SODIUM,BOVINE 1000/ML
5000 VIAL (ML) INJECTION EVERY 12 HOURS
Refills: 0 | Status: DISCONTINUED | OUTPATIENT
Start: 2024-08-31 | End: 2024-09-03

## 2024-08-31 RX ADMIN — Medication 5000 UNIT(S): at 05:35

## 2024-08-31 RX ADMIN — Medication 81 MILLIGRAM(S): at 11:17

## 2024-08-31 RX ADMIN — Medication 5000 UNIT(S): at 17:37

## 2024-08-31 NOTE — CONSULT NOTE ADULT - SUBJECTIVE AND OBJECTIVE BOX
Patient is a 24y old  Male who presents with a chief complaint of Syncope (31 Aug 2024 09:17)        HPI:   24-year-old male past medical history of Cardiac arrest in 2024 prompting around a month hospital stay  at [facility] s/p being on ventillar discharged recently from [facility] in [place], presents to the ED for evaluation of syncope.    Patient accompanied by mother. Patient was at an [facility] center when he was walking around and wanted to sit somewhere, found a chair,  and asked for water. As he sat down, his mother reports he had lost consciousness and was falling forward off his chair, and caught him from falling. LOC around  few seconds, and as per mother patient appeared clammy, diaphoretic, pale and upon waking up patient was at baseline, but did c/o blurry vision. Denies any prodromal or post ictal symptoms, but as per mother she saw some shaking. Denies any tongue biting, urinary incontinence.   As per mother, patient had around a month hospital stay at [facility] post cardiac arrest, where he was told his plumbing of heart is normal , but had problems with wiring. Also reports having an EEG done showed no seizures. Patient since discharge has been following  with cardiologist Dr. Becker in [place], and since then he reports having an abnormal EP study, and was recommended a life vest but due to insurance issues he was not able to get it. Subsequently, he underwent another EP study which as per mother was "normal".   Patient during admission seen in the ED, at baseline mental status,  denies any chest pain, SOB, fever, chills, cough, sickness, n/v/d, head ache, pain, leg swelling/pain, hx of seizures.   BP on ED arrival  91/57 mm, and he received 1 L NS in the ED. prior to admission. Patient further admitted to cardiology for further management. BP on admission 105/70.      (30 Aug 2024 01:53)      Electrophysiology:  24y Male    REVIEW OF SYSTEMS    [ ] A ten-point review of systems was otherwise negative except as noted.  [ ] Due to altered mental status/intubation, subjective information were not able to be obtained from the patient. History was obtained, to the extent possible, from review of the chart and collateral sources of information.      PAST MEDICAL & SURGICAL HISTORY:      Home Medications:  amantadine 100 mg oral tablet: 1 tab(s) orally 2 times a day (30 Aug 2024 01:48)  aspirin 81 mg oral delayed release tablet: 1 tab(s) orally once a day (30 Aug 2024 01:49)  gabapentin 300 mg oral tablet: 1 tab(s) orally 3 times a day (30 Aug 2024 01:50)  Multiple Vitamins oral tablet: 1 tab(s) orally once a day (30 Aug 2024 01:50)  Toprol-XL 25 mg oral tablet, extended release: 1 tab(s) orally 2 times a day (30 Aug 2024 01:51)      Allergies:  No Known Allergies      FAMILY HISTORY:      SOCIAL HISTORY:    CIGARETTES:  ALCOHOL:  MARIJUANA:  ILLICIT DRUGS:        PREVIOUS DIAGNOSTIC TESTING:      ECHO  FINDINGS:    NON-INVASIVE   FINDINGS:    CATHETERIZATION  FINDINGS:    ELECTROPHYSIOLOGY STUDY  FINDINGS:    CAROTIDS :  FINDINGS    VENOUS DUPLEX SCAN:  FINDINGS:    CHEST CT PULMONARY ANGIO with IV Contrast:  FINDINGS:      MEDICATIONS  (STANDING):  aspirin enteric coated 81 milliGRAM(s) Oral daily  heparin   Injectable 5000 Unit(s) SubCutaneous every 12 hours    MEDICATIONS  (PRN):  acetaminophen     Tablet .. 650 milliGRAM(s) Oral every 6 hours PRN Moderate Pain (4 - 6)      Vital Signs Last 24 Hrs  T(C): 36.6 (31 Aug 2024 07:39), Max: 37 (31 Aug 2024 00:30)  T(F): 97.9 (31 Aug 2024 07:39), Max: 98.6 (31 Aug 2024 00:30)  HR: 67 (31 Aug 2024 07:39) (62 - 68)  BP: 117/75 (31 Aug 2024 07:39) (107/67 - 137/80)  BP(mean): 91 (31 Aug 2024 07:39) (82 - 103)  RR: 18 (31 Aug 2024 07:39) (18 - 19)  SpO2: 96% (31 Aug 2024 07:39) (95% - 99%)    Parameters below as of 31 Aug 2024 07:39  Patient On (Oxygen Delivery Method): room air        PHYSICAL EXAM:    GENERAL: In no apparent distress, well nourished, and hydrated.  HEAD:  Atraumatic, Normocephalic  EYES: EOMI, PERRLA, conjunctiva and sclera clear  NECK: Supple and normal thyroid.  No JVD or carotid bruit.  Carotid pulse is 2+ bilaterally.  HEART: Regular rate and rhythm; No murmurs, rubs, or gallops.  PULMONARY: Clear to auscultation and perfusion.  No rales, wheezing, or rhonchi bilaterally.  ABDOMEN: Soft, Nontender, Nondistended; Bowel sounds present  EXTREMITIES:  2+ Peripheral Pulses, No clubbing, cyanosis, or edema  NEUROLOGICAL: Grossly nonfocal    I&O's Detail    30 Aug 2024 07:01  -  31 Aug 2024 07:00  --------------------------------------------------------  IN:    sodium chloride 0.9%: 450 mL  Total IN: 450 mL    OUT:    Voided (mL): 750 mL  Total OUT: 750 mL    Total NET: -300 mL      31 Aug 2024 07:01  -  31 Aug 2024 16:01  --------------------------------------------------------  IN:    Oral Fluid: 440 mL  Total IN: 440 mL    OUT:    Voided (mL): 1580 mL  Total OUT: 1580 mL    Total NET: -1140 mL        Daily     Daily     INTERPRETATION OF TELEMETRY:    EC Lead ECG:   Ventricular Rate 54 BPM    Atrial Rate 54 BPM    P-R Interval 132 ms    QRS Duration 104 ms    Q-T Interval 406 ms    QTC Calculation(Bazett) 385 ms    P Axis 59 degrees    R Axis 80 degrees    T Axis 58 degrees    Diagnosis Line Sinus bradycardia  Voltage criteria for left ventricular hypertrophy  ST elevation, consider early repolarization  Abnormal ECG    Confirmed by Nadia Zavala ([id number]) on 2024 8:59:23 AM (24 @ 05:05)  12 Lead ECG:   Ventricular Rate 48 BPM    Atrial Rate 48 BPM    P-R Interval 124 ms    QRS Duration 106 ms    Q-T Interval 424 ms    QTC Calculation(Bazett) 378 ms    P Axis 72 degrees    R Axis 90 degrees    T Axis 71 degrees    Diagnosis Line Sinus bradycardia  Rightward axis  Early repolarization  Borderline ECG    Confirmed by Omar Marino ([id number]) on 2024 8:54:23 PM (24 @ 18:21)        LABS:                        12.5   6.32  )-----------( 211      ( 31 Aug 2024 05:42 )             37.5         140  |  104  |  9<L>  ----------------------------<  87  3.9   |  26  |  0.8    Ca    9.3      31 Aug 2024 05:42  Mg     1.9         TPro  7.0  /  Alb  4.5  /  TBili  0.5  /  DBili  x   /  AST  17  /  ALT  29  /  AlkPhos  108      PT/INR - ( 30 Aug 2024 07:39 )   PT: 12.60 sec;   INR: 1.10 ratio         PTT - ( 30 Aug 2024 07:39 )  PTT:31.5 sec      BNPPro-Brain Natriuretic Peptide: 86 pg/mL ( @ 18:50)              RADIOLOGY & ADDITIONAL STUDIES:       Patient is a 24y old  Male who presents with a chief complaint of Syncope (31 Aug 2024 09:17)        HPI:   24-year-old male past medical history of Cardiac arrest in 2024 prompting around a month hospital stay  at [facility] s/p being on ventillar discharged recently from LT in [place], presents to the ED for evaluation of syncope.    Patient accompanied by mother. Patient was at an [facility] [place] center when he was walking around and wanted to sit somewhere, found a chair,  and asked for water. As he sat down, his mother reports he had lost consciousness and was falling forward off his chair, and caught him from falling. LOC around  few seconds, and as per mother patient appeared clammy, diaphoretic, pale and upon waking up patient was at baseline, but did c/o blurry vision. Denies any prodromal or post ictal symptoms, but as per mother she saw some shaking. Denies any tongue biting, urinary incontinence.   As per mother, patient had around a month hospital stay at [facility] post cardiac arrest, where he was told his plumbing of heart is normal , but had problems with wiring. Also reports having an EEG done showed no seizures. Patient since discharge has been following  with cardiologist Dr. Becker in [place], and since then he reports having an abnormal EP study, and was recommended a life vest but due to insurance issues he was not able to get it. Subsequently, he underwent another EP study which as per mother was "normal".   Patient during admission seen in the ED, at baseline mental status,  denies any chest pain, SOB, fever, chills, cough, sickness, n/v/d, head ache, pain, leg swelling/pain, hx of seizures.   BP on ED arrival  91/57 mm, and he received 1 L NS in the ED. prior to admission. Patient further admitted to cardiology for further management. BP on admission 105/70.      (30 Aug 2024 01:53)      Electrophysiology:  24y Male with family h/o SCD, father deat at 34yo, no significant medical history until 2024 when suffered cardiac arrest at home, was admitted to [facility], complicated hospital stay, eventually discharged to LT. While in rehab was noted abnormalities on routine EKG and admitted to [facility], where he underwent extensive cardiac eval including cardiac MRI, EP study and ablation, was recommended wearable cardiac defibrillator but due to insurance issues he was not able to get it, and follow up EP study prior to discharge. Patient is admitted after syncopal episode.  As patient sat down, his mother reports he had lost consciousness and was falling forward off his chair, and caught him from falling. LOC around  few seconds, and as per mother patient appeared clammy, diaphoretic, pale and upon waking up patient was at baseline, but did c/o blurry vision. Denies any prodromal or post ictal symptoms, but as per mother she saw some shaking. Reports 2 prior syncopal episodes earlier in life first after usuing sauna, and another during urination.   In 2024, was at home, normal, day prior was not feeling well vomiting after a party, but on the day of event, was feeling normal, interacting with family, suddenly became unresponsive, EMS was called. Per [facility] records, [employer] arrived found patient pulseless with shockable rhythm, started BLS and defibrilated x3, on EMS arrival patient was found to be in VFib, ACLS started, was shocked x3, received epi x3, ROSC was achieved, intubated in the filed. Timing uncleared, >20min per mother.   Awaiting records from [place]    REVIEW OF SYSTEMS    [ ] A ten-point review of systems was otherwise negative except as noted.  [ ] Due to altered mental status/intubation, subjective information were not able to be obtained from the patient. History was obtained, to the extent possible, from review of the chart and collateral sources of information.      PAST MEDICAL & SURGICAL HISTORY:      Home Medications:  amantadine 100 mg oral tablet: 1 tab(s) orally 2 times a day (30 Aug 2024 01:48)  aspirin 81 mg oral delayed release tablet: 1 tab(s) orally once a day (30 Aug 2024 01:49)  gabapentin 300 mg oral tablet: 1 tab(s) orally 3 times a day (30 Aug 2024 01:50)  Multiple Vitamins oral tablet: 1 tab(s) orally once a day (30 Aug 2024 01:50)  Toprol-XL 25 mg oral tablet, extended release: 1 tab(s) orally 2 times a day (30 Aug 2024 01:51)      Allergies:  No Known Allergies      FAMILY HISTORY: father SCD @33      SOCIAL HISTORY:    CIGARETTES: denies   ALCOHOL: social  MARIJUANA: occasional  ILLICIT DRUGS: denies        PREVIOUS DIAGNOSTIC TESTING:      ECHO  FINDINGS:  < from: TTE Echo Complete w/o Contrast w/ Doppler (24 @ 08:27) >  Summary:   1. Normal global left ventricular systolic function.   2. LV Ejection Fraction by Harrison's Method with a biplane EF of 62 %.   3. Mildly enlarged right ventricle. RV systolic function is normal   4. No hemodynamically significant valvular regurgitation or stenosis.   5. Normal pulmonary artery pressure.    PHYSICIAN INTERPRETATION:  Left Ventricle: The left ventricular internal cavity size is normal. Left   ventricular wall thickness is normal. Global LV systolic function was   normal. Spectral Doppler shows normal pattern of LV diastolic filling.   Normal LV filling pressures.  Right Ventricle: The right ventricular size is mildly enlarged. RV   systolic function is normal.  Left Atrium: Normal left atrial size.  Right Atrium: Normal right atrial size.  Pericardium: Trivial pericardial effusion is present.  Mitral Valve: Structurally normal mitral valve, with normal leaflet   excursion. No evidence of mitral stenosis. Trace mitral valve   regurgitation is seen.  Tricuspid Valve: Structurally normal tricuspid valve, with normal leaflet   excursion. Trivial tricuspidregurgitation is visualized.  Aortic Valve: Normal trileaflet aortic valve with normal opening. No   evidence of aortic stenosis. No evidence of aortic valve regurgitation is   seen.  Pulmonic Valve: Structurally normal pulmonic valve, with normal leaflet   excursion. Trace pulmonic valve regurgitation.  Aorta: The aortic root and ascending aorta are structurally normal, with   no evidence of dilitation.  Pulmonary Artery: Normal pulmonary artery pressure.  Venous: The inferior vena cava was normal sized, with respiratory size   variation greater than 50%.    < end of copied text >      NON-INVASIVE   FINDINGS:    CATHETERIZATION  FINDINGS:    ELECTROPHYSIOLOGY STUDY  FINDINGS:    CAROTIDS :  FINDINGS    VENOUS DUPLEX SCAN:  FINDINGS:    CHEST CT PULMONARY ANGIO with IV Contrast:  FINDINGS:  < from: CT Angio Chest PE Protocol w/ IV Cont (24 @ 19:56) >  FINDINGS:    LUNGS AND LARGE AIRWAYS: Patent central airways. No pulmonary nodules.  PLEURA: No pleural effusion.  VESSELS: Within normal limits.  HEART: Heart size is normal. No pericardial effusion.  MEDIASTINUM AND SHAY: No lymphadenopathy.  CHEST WALL AND LOWER NECK: Within normal limits.  VISUALIZED UPPER ABDOMEN: Within normal limits.  BONES: Within normal limits.    IMPRESSION:  No CTA evidence of acute pulmonary embolus      < end of copied text >      MEDICATIONS  (STANDING):  aspirin enteric coated 81 milliGRAM(s) Oral daily  heparin   Injectable 5000 Unit(s) SubCutaneous every 12 hours    MEDICATIONS  (PRN):  acetaminophen     Tablet .. 650 milliGRAM(s) Oral every 6 hours PRN Moderate Pain (4 - 6)      Vital Signs Last 24 Hrs  T(C): 36.6 (31 Aug 2024 07:39), Max: 37 (31 Aug 2024 00:30)  T(F): 97.9 (31 Aug 2024 07:39), Max: 98.6 (31 Aug 2024 00:30)  HR: 67 (31 Aug 2024 07:39) (62 - 68)  BP: 117/75 (31 Aug 2024 07:39) (107/67 - 137/80)  BP(mean): 91 (31 Aug 2024 07:39) (82 - 103)  RR: 18 (31 Aug 2024 07:39) (18 - 19)  SpO2: 96% (31 Aug 2024 07:39) (95% - 99%)    Parameters below as of 31 Aug 2024 07:39  Patient On (Oxygen Delivery Method): room air        PHYSICAL EXAM:    GENERAL: In no apparent distress, well nourished, and hydrated.  HEAD:  Atraumatic, Normocephalic  EYES: EOMI, PERRLA, conjunctiva and sclera clear  NECK: Supple and normal thyroid.  No JVD or carotid bruit.  Carotid pulse is 2+ bilaterally.  HEART: Regular rate and rhythm; No murmurs, rubs, or gallops.  PULMONARY: Clear to auscultation and perfusion.  No rales, wheezing, or rhonchi bilaterally.  ABDOMEN: Soft, Nontender, Nondistended; Bowel sounds present  EXTREMITIES:  2+ Peripheral Pulses, No clubbing, cyanosis, or edema  NEUROLOGICAL: Grossly nonfocal    I&O's Detail    30 Aug 2024 07:01  -  31 Aug 2024 07:00  --------------------------------------------------------  IN:    sodium chloride 0.9%: 450 mL  Total IN: 450 mL    OUT:    Voided (mL): 750 mL  Total OUT: 750 mL    Total NET: -300 mL      31 Aug 2024 07:01  -  31 Aug 2024 16:01  --------------------------------------------------------  IN:    Oral Fluid: 440 mL  Total IN: 440 mL    OUT:    Voided (mL): 1580 mL  Total OUT: 1580 mL    Total NET: -1140 mL        Daily     Daily     INTERPRETATION OF TELEMETRY:    EC Lead ECG:   Ventricular Rate 54 BPM    Atrial Rate 54 BPM    P-R Interval 132 ms    QRS Duration 104 ms    Q-T Interval 406 ms    QTC Calculation(Bazett) 385 ms    P Axis 59 degrees    R Axis 80 degrees    T Axis 58 degrees    Diagnosis Line Sinus bradycardia  Voltage criteria for left ventricular hypertrophy  ST elevation, consider early repolarization  Abnormal ECG    Confirmed by Nadia Zavala ([id number]) on 2024 8:59:23 AM (24 @ 05:05)  12 Lead ECG:   Ventricular Rate 48 BPM    Atrial Rate 48 BPM    P-R Interval 124 ms    QRS Duration 106 ms    Q-T Interval 424 ms    QTC Calculation(Bazett) 378 ms    P Axis 72 degrees    R Axis 90 degrees    T Axis 71 degrees    Diagnosis Line Sinus bradycardia  Rightward axis  Early repolarization  Borderline ECG    Confirmed by Omar Marino ([id number]) on 2024 8:54:23 PM (24 @ 18:21)        LABS:                        12.5   6.32  )-----------( 211      ( 31 Aug 2024 05:42 )             37.5         140  |  104  |  9<L>  ----------------------------<  87  3.9   |  26  |  0.8    Ca    9.3      31 Aug 2024 05:42  Mg     1.9         TPro  7.0  /  Alb  4.5  /  TBili  0.5  /  DBili  x   /  AST  17  /  ALT  29  /  AlkPhos  108      PT/INR - ( 30 Aug 2024 07:39 )   PT: 12.60 sec;   INR: 1.10 ratio         PTT - ( 30 Aug 2024 07:39 )  PTT:31.5 sec      BNPPro-Brain Natriuretic Peptide: 86 pg/mL ( @ 18:50)              RADIOLOGY & ADDITIONAL STUDIES:      < from: Xray Chest 2 Views PA/Lat (23 @ 13:49) >  Findings:    Support devices: None.    Cardiac/mediastinum/hilum: Unremarkable.    Lung parenchyma/Pleura: Within normal limits.    Skeleton/soft tissues: Unremarkable.    Impression:    No radiographic evidence of acute cardiopulmonary disease.    < end of copied text >

## 2024-08-31 NOTE — CONSULT NOTE ADULT - ASSESSMENT
ASSESSMENT/RECS    24-year-old male past medical history of Cardiac arrest in June 2024 (unclear cause, mother states it has to do w electricity malfunction in heart), questionable history of hypertrophic cardiomyopathy, presents for syncopal episode. Pt does not have diagnosed seizure history, pt was on VEEG during last admission June 2024, which mother reported as normal, pt was never on AEDs. Neurologic exam was unremarkable. Of note, on admission pt had a BP of 91/57, pts mother does not recall being told his bp was low prior to presentation to ED.     Impression: Suspect syncope is likely cardiogenic in nature given history of cardiac arrest, possible hypertrophic cardiomyopathy, and low BP on arrival. Lower suspicion of seizure given mother reported pt had extensive VEEG performed in June which was normal and was never put on AEDs. Will obtain rEEG to evaluate for epileptic potential, if rEEG abnormal, will consider VEEG.     RECS  - rEEG  - Continue w cardiac workup for syncope     Discussed w Dr. Pena 
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 Early repolarization present on current EKG    syncope  arrhythmia, ?WPW, s/p ablation  h/o VF arest  FHx SCD      con't tele  please obtain records from [facility]  Check TSH / Free T4   Maintain electrolytes K>4.0 Mg >2.5   cardiac MRI unless was done at outside hospital  will follow

## 2024-08-31 NOTE — CONSULT NOTE ADULT - NS ATTEND AMEND GEN_ALL_CORE FT
Complex patient  Syncope - etiology unclear (vagal vs. 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

## 2024-08-31 NOTE — PHYSICAL THERAPY INITIAL EVALUATION ADULT - LEVEL OF INDEPENDENCE: TOILET, REHAB EVAL
Pt. standing at toilet without assistance for urination, managing own pants, stood at sink to wash hands, requiring no assistance or cueing./supervision

## 2024-08-31 NOTE — CONSULT NOTE ADULT - NS ATTEND AMEND GEN_ALL_CORE FT
Had vEEG recently with reportedly normal findings. Neuro exam unremarkable. Given extensive cardiac hx, syncope favored over seizures. Can obtain routine EEG, no need for ASMs for now.

## 2024-08-31 NOTE — CONSULT NOTE ADULT - SUBJECTIVE AND OBJECTIVE BOX
Neurology Consult    Patient is a 24y old  Male who presents with a chief complaint of Syncope (31 Aug 2024 16:00)      HPI:   24-year-old male past medical history of Cardiac arrest in June 2024 (unclear cause, mother states it has to do w electricity malfunction in heart), questionable history of hypertrophic cardiomyopathy, presents for witnessed syncopal episode. Pt states he was sitting in a parking lot, when he "blacked out" pt was unable to recall event but recalls waking up "and I felt like I was looking threw a lens." Pt denies urinary incontinence. Remaining hx obtained from mother. Mother states he was sitting down when suddenly he began leaning forward, she caught him. Reports that she thinks his eyes were closed and he was minimally shaking, also reports after he was sat back up "he looked like he was in a trance," however she was unable to expand as she blocked out the episode. Mother states he had similar episode a few years ago, he slid down the door and appeared to be staring off and not alert. Mother states during his previous hospital admission in June 2024, he had a video EEG done which was normal, pt was never on AEDs.        (30 Aug 2024 01:53)      PAST MEDICAL & SURGICAL HISTORY:      FAMILY HISTORY:      Social History: (-) x 3    Allergies    No Known Allergies    Intolerances        MEDICATIONS  (STANDING):  aspirin enteric coated 81 milliGRAM(s) Oral daily  heparin   Injectable 5000 Unit(s) SubCutaneous every 12 hours    MEDICATIONS  (PRN):  acetaminophen     Tablet .. 650 milliGRAM(s) Oral every 6 hours PRN Moderate Pain (4 - 6)      Review of systems:    see hpi    Vital Signs Last 24 Hrs  T(C): 36.6 (31 Aug 2024 07:39), Max: 37 (31 Aug 2024 00:30)  T(F): 97.9 (31 Aug 2024 07:39), Max: 98.6 (31 Aug 2024 00:30)  HR: 67 (31 Aug 2024 07:39) (62 - 68)  BP: 117/75 (31 Aug 2024 07:39) (107/67 - 117/75)  BP(mean): 91 (31 Aug 2024 07:39) (82 - 91)  RR: 18 (31 Aug 2024 07:39) (18 - 18)  SpO2: 96% (31 Aug 2024 07:39) (96% - 99%)    Parameters below as of 31 Aug 2024 07:39  Patient On (Oxygen Delivery Method): room air        Examination:  General:  Appearance is consistent with chronologic age.  No abnormal facies.  Gross skin survey within normal limits.    Cognitive/Language:  The patient is oriented to person, place, time and date.  Recent and remote memory intact.  Fund of knowledge is intact and normal.  Language with normal repetition, comprehension and naming.  Nondysarthric.    Eyes: intact VA, VFF.  EOMI w/o nystagmus, skew or reported double vision.  PERRL.  No ptosis/weakness of eyelid closure.    Face:  Facial sensation normal V1 - 3, no facial asymmetry.    Ears/Nose/Throat:  Hearing grossly intact b/l.  Palate elevates midline.  Tongue and uvula midline.   Motor examination:   Normal tone, bulk and range of motion.  No tenderness, twitching, tremors or involuntary movements.  Formal Muscle Strength Testing: (MRC grade R/L) 5/5 UE; 5/5 LE.  No observable drift.  Sensory examination:   Intact to light touch   Cerebellum:   FTN/HKS intact with normal EROS in all limbs.  No dysmetria or dysdiadokinesia.          Labs:   CBC Full  -  ( 31 Aug 2024 05:42 )  WBC Count : 6.32 K/uL  RBC Count : 4.06 M/uL  Hemoglobin : 12.5 g/dL  Hematocrit : 37.5 %  Platelet Count - Automated : 211 K/uL  Mean Cell Volume : 92.4 fL  Mean Cell Hemoglobin : 30.8 pg  Mean Cell Hemoglobin Concentration : 33.3 g/dL  Auto Neutrophil # : x  Auto Lymphocyte # : x  Auto Monocyte # : x  Auto Eosinophil # : x  Auto Basophil # : x  Auto Neutrophil % : x  Auto Lymphocyte % : x  Auto Monocyte % : x  Auto Eosinophil % : x  Auto Basophil % : x    08-31    140  |  104  |  9<L>  ----------------------------<  87  3.9   |  26  |  0.8    Ca    9.3      31 Aug 2024 05:42  Mg     1.9     08-31    TPro  7.0  /  Alb  4.5  /  TBili  0.5  /  DBili  x   /  AST  17  /  ALT  29  /  AlkPhos  108  08-29    LIVER FUNCTIONS - ( 29 Aug 2024 18:50 )  Alb: 4.5 g/dL / Pro: 7.0 g/dL / ALK PHOS: 108 U/L / ALT: 29 U/L / AST: 17 U/L / GGT: x           PT/INR - ( 30 Aug 2024 07:39 )   PT: 12.60 sec;   INR: 1.10 ratio         PTT - ( 30 Aug 2024 07:39 )  PTT:31.5 sec  Urinalysis Basic - ( 31 Aug 2024 05:42 )    Color: x / Appearance: x / SG: x / pH: x  Gluc: 87 mg/dL / Ketone: x  / Bili: x / Urobili: x   Blood: x / Protein: x / Nitrite: x   Leuk Esterase: x / RBC: x / WBC x   Sq Epi: x / Non Sq Epi: x / Bacteria: x          [facility]  [facility]     08-31-24 @ 17:43

## 2024-08-31 NOTE — PROGRESS NOTE ADULT - SUBJECTIVE AND OBJECTIVE BOX
Chief complaint: Patient is a 24y old  Male who presents with a chief complaint of Syncope (30 Aug 2024 01:53)    Interval history: Patient seen and examined this AM. Patient tired on exam. Accompanied by mother.    Review of systems: A complete 10-point review of systems was obtained and is negative except as stated in the interval history.    Vitals:  T(F): 98.1, Max: 98.5 (08-29 @ 23:04)  HR: 64 (48 - 81)  BP: 115/71 (91/57 - 119/60)  RR: 18 (18 - 18)  SpO2: 100% (98% - 100%)    Ins & outs:     08-30 @ 07:01  -  08-30 @ 10:26  --------------------------------------------------------  IN: 300 mL / OUT: 0 mL / NET: 300 mL      Weight trend:  Weight (kg): 65.3 (08-30)    Physical exam:  General: No apparent distress  HEENT: Anicteric sclera. Moist mucous membranes. JVP *** cm.   Cardiac: Regular rate and rhythm. No murmurs, rubs, or gallops.   Vascular: Symmetric radial pulses. Dorsalis pedis pulses palpable.   Respiratory: Normal effort. Clear to ascultation.   Abdomen: Soft, nontender. Audible bowel sounds.   Extremities: Warm with no edema. No cyanosis or clubbing.   Skin: Warm and dry. No rash.   Neurologic: Grossly normal motor function.   Psychiatric: Oriented to person, place, and time.     Data reviewed:  - Telemetry: NS 49-67bpm    - ECG (8/30/24):   Diagnosis Line Sinus bradycardia  Voltage criteria for left ventricular hypertrophy  ST elevation, consider early repolarization  Abnormal ECG    - TTE: Pending    - Chest x-ray (8/29/24):   Impression:  No radiographic evidence of acute cardiopulmonary disease.      - Labs:                        11.6   7.25  )-----------( 234      ( 30 Aug 2024 07:39 )             35.9     08-29    142  |  103  |  12  ----------------------------<  101<H>  4.5   |  30  |  1.0    Ca    9.8      29 Aug 2024 18:50  Mg     1.8     08-29    TPro  7.0  /  Alb  4.5  /  TBili  0.5  /  DBili  x   /  AST  17  /  ALT  29  /  AlkPhos  108  08-29    PT/INR - ( 30 Aug 2024 07:39 )   PT: 12.60 sec;   INR: 1.10 ratio         PTT - ( 30 Aug 2024 07:39 )  PTT:31.5 sec            Urinalysis Basic - ( 29 Aug 2024 18:50 )    Color: x / Appearance: x / SG: x / pH: x  Gluc: 101 mg/dL / Ketone: x  / Bili: x / Urobili: x   Blood: x / Protein: x / Nitrite: x   Leuk Esterase: x / RBC: x / WBC x   Sq Epi: x / Non Sq Epi: x / Bacteria: x        Medications:  amantadine 100 milliGRAM(s) Oral two times a day  aspirin enteric coated 81 milliGRAM(s) Oral daily  gabapentin 300 milliGRAM(s) Oral three times a day  heparin   Injectable 5000 Unit(s) SubCutaneous every 12 hours    Drips:  sodium chloride 0.9%. 1000 milliLiter(s) (75 mL/Hr) IV Continuous <Continuous>    PRN:     Allergies    No Known Allergies    Intolerances       Chief complaint: Patient is a 24y old  Male who presents with a chief complaint of Syncope (30 Aug 2024 01:53)    Interval history: Patient seen and examined this AM with mother at bedside. No overnight events.      Review of systems: A complete 10-point review of systems was obtained and is negative except as stated in the interval history.    Vitals:  Vital Signs Last 24 Hrs  T(C): 36.6 (31 Aug 2024 07:39), Max: 37 (31 Aug 2024 00:30)  T(F): 97.9 (31 Aug 2024 07:39), Max: 98.6 (31 Aug 2024 00:30)  HR: 67 (31 Aug 2024 07:39) (62 - 68)  BP: 117/75 (31 Aug 2024 07:39) (107/67 - 137/80)  BP(mean): 91 (31 Aug 2024 07:39) (82 - 103)  RR: 18 (31 Aug 2024 07:39) (18 - 19)  SpO2: 96% (31 Aug 2024 07:39) (95% - 99%)    Parameters below as of 31 Aug 2024 07:39  Patient On (Oxygen Delivery Method): room air        Ins & outs:     08-30 @ 07:01  -  08-30 @ 10:26  --------------------------------------------------------  IN: 300 mL / OUT: 0 mL / NET: 300 mL    I&O's Summary    30 Aug 2024 07:01  -  31 Aug 2024 07:00  --------------------------------------------------------  IN: 450 mL / OUT: 750 mL / NET: -300 mL    31 Aug 2024 07:01  -  31 Aug 2024 09:20  --------------------------------------------------------  IN: 210 mL / OUT: 1200 mL / NET: -990 mL          Weight trend:  Weight (kg): 65.3 (08-30)    Physical exam:  General: No apparent distress  HEENT: Anicteric sclera. Moist mucous membranes. JVPnegative   Cardiac: Regular rate and rhythm. No murmurs, rubs, or gallops.   Vascular: Symmetric radial pulses. Dorsalis pedis pulses palpable.   Respiratory: Normal effort. Clear to ascultation.   Abdomen: Soft, nontender. Audible bowel sounds.   Extremities: Warm with no edema. No cyanosis or clubbing.   Skin: Warm and dry. No rash.   Neurologic: Grossly normal motor function.   Psychiatric: Oriented to person, place, and time.     Data reviewed:  - Telemetry: NS  NSR HR ranging from  40-70, no arrhythmias noted      - ECG (8/30/24):   Diagnosis Line Sinus bradycardia  Voltage criteria for left ventricular hypertrophy  ST elevation, consider early repolarization  Abnormal ECG    - TTE:   < from: TTE Echo Complete w/o Contrast w/ Doppler (08.30.24 @ 08:27) >  PHYSICIAN INTERPRETATION:  Left Ventricle: The left ventricular internal cavity size is normal. Left   ventricular wall thickness is normal. Global LV systolic function was   normal. Spectral Doppler shows normal pattern of LV diastolic filling.   Normal LV filling pressures.  Right Ventricle: The right ventricular size is mildly enlarged. RV   systolic function is normal.  Left Atrium: Normal left atrial size.  Right Atrium: Normal right atrial size.  Pericardium: Trivial pericardial effusion is present.  Mitral Valve: Structurally normal mitral valve, with normal leaflet   excursion. No evidence of mitral stenosis. Trace mitral valve   regurgitation is seen.  Tricuspid Valve: Structurally normal tricuspid valve, with normal leaflet   excursion. Trivial tricuspidregurgitation is visualized.  Aortic Valve: Normal trileaflet aortic valve with normal opening. No   evidence of aortic stenosis. No evidence of aortic valve regurgitation is   seen.  Pulmonic Valve: Structurally normal pulmonic valve, with normal leaflet   excursion. Trace pulmonic valve regurgitation.  Aorta: The aortic root and ascending aorta are structurally normal, with   no evidence of dilitation.  Pulmonary Artery: Normal pulmonary artery pressure.  Venous: The inferior vena cava was normal sized, with respiratory size   variation greater than 50%.  In comparison to the previous echocardiogram(s): There are no prior   studies on this patient for comparison purposes.      < end of copied text >      - Chest x-ray (8/29/24):   Impression:  No radiographic evidence of acute cardiopulmonary disease.          - Labs:                        12.5   6.32  )-----------( 211      ( 31 Aug 2024 05:42 )             37.5     08-31    140  |  104  |  9<L>  ----------------------------<  87  3.9   |  26  |  0.8    Ca    9.3      31 Aug 2024 05:42  Mg     1.9     08-31    TPro  7.0  /  Alb  4.5  /  TBili  0.5  /  DBili  x   /  AST  17  /  ALT  29  /  AlkPhos  108  08-29    LIVER FUNCTIONS - ( 29 Aug 2024 18:50 )  Alb: 4.5 g/dL / Pro: 7.0 g/dL / ALK PHOS: 108 U/L / ALT: 29 U/L / AST: 17 U/L / GGT: x           PT/INR - ( 30 Aug 2024 07:39 )   PT: 12.60 sec;   INR: 1.10 ratio         PTT - ( 30 Aug 2024 07:39 )  PTT:31.5 sec          Lactate Trend    Urinalysis Basic - ( 31 Aug 2024 05:42 )    Color: x / Appearance: x / SG: x / pH: x  Gluc: 87 mg/dL / Ketone: x  / Bili: x / Urobili: x   Blood: x / Protein: x / Nitrite: x   Leuk Esterase: x / RBC: x / WBC x   Sq Epi: x / Non Sq Epi: x / Bacteria: x            Medications:  MEDICATIONS  (STANDING):  aspirin enteric coated 81 milliGRAM(s) Oral daily  heparin   Injectable 5000 Unit(s) SubCutaneous every 12 hours    MEDICATIONS  (PRN):  acetaminophen     Tablet .. 650 milliGRAM(s) Oral every 6 hours PRN Moderate Pain (4 - 6)      PRN:     Allergies    No Known Allergies    Intolerances

## 2024-08-31 NOTE — PHYSICAL THERAPY INITIAL EVALUATION ADULT - REHAB POTENTIAL, PT EVAL
PT not indicated in acute setting. Rec outpatient cardiac monitored PT when medically cleared to advance cardio and resistance training

## 2024-08-31 NOTE — PROGRESS NOTE ADULT - ASSESSMENT
Assessment:   24-year-old male past medical history of  Cardiac arrest in June 2024 prompting around a month hospital stay  at [facility] s/p being on ventillar discharged recently from LTAC in [place], presents to the ED for evaluation of syncope today, denies any post ictal or prodromal symptoms, hypotensive on arrival.  s/p 500 cc with EMS , and BP on arrival  91/57 mm, and he received 1 L NS in the ED with improvement in BP to 105/70. During admission, patient resting comfortably in stretcher in ED, at baseline mental status, denies any chest pain, palpitations, lightheadedness, dizziness, blurry vision. Patient admitted to cardiac tele for further work-up of syncope.       Problems discussed and associated plan:  #Syncope  - h/o cardiac arrest  - Monitor on tele   - s/p 1 L NS   - TTE pending official read  - Pt takes Toprol 25 mg bid at home, currently HR 50s  - continue Aspirin 81 mg daily   - EP consult: Wearable cardiac defibrillator, ILR, and eventual genetic testing   - Cardiac MRI pending to assess for infiltrative disease  - Obtaining records from [facility], [facility], and [facility] [place]  - F/u am ekgs    #Muscle spasms  - DCed amantidine 100 mg bid and gabapentin 300 mg tid   - monitor    #DVT ppx  - Early ambulation     Please contact me with any questions or concerns at [phone]. Assessment:   24-year-old male past medical history of  Cardiac arrest in June 2024 prompting around a month hospital stay  at [facility] s/p being on ventillar discharged recently from LTAC in [place], presents to the ED for evaluation of syncope today, denies any post ictal or prodromal symptoms, hypotensive on arrival.  s/p 500 cc with EMS , and BP on arrival  91/57 mm, and he received 1 L NS in the ED with improvement in BP to 105/70. During admission, patient resting comfortably in stretcher in ED, at baseline mental status, denies any chest pain, palpitations, lightheadedness, dizziness, blurry vision. Patient admitted to cardiac tele for further work-up of syncope.       Problems discussed and associated plan:  #Syncope  - h/o cardiac arrest   - Monitor on tele   - s/p 1 L NS   - Trop: 13 --> 11  - ECG 8/3024: Sinus bradycardia with LVH, high voltage QRS in the lateral precordial leads, normal QTc  - TTE 8/30/24: LVEF 62%, normal LV size and wall thickness, RV is mildly enlarged, RVH, no valvular abnormalities, no wall motion abnormalities   - On ASA 81 mg daily, unclear reason but will continue until OSH obtained  - Pt takes Toprol 25 mg bid at home, currently HR 48-70- BB on hold   - Cardiac MRI pending to assess for infiltrative disease  - Will not order CCTA at this time because LM is visible off C, awaiting OSH records  - Obtaining records from [facility], [facility], and [facility]  - Consult EP for ILR and LifeVest versus ICD placement, eventual genetic testing   - F/u am ekgs    #Muscle spasms  - Discontinue gabapentin and amantadine, which was prescribed for post-arrest muscle spasms while at [facility]      #DVT ppx  - Early ambulation     Please contact me with any questions or concerns at [phone]. Assessment:   24-year-old male past medical history of  Cardiac arrest in June 2024 prompting around a month hospital stay  at [facility] s/p being on ventillar discharged recently from LTAC in [place], presents to the ED for evaluation of syncope today, denies any post ictal or prodromal symptoms, hypotensive on arrival.  s/p 500 cc with EMS , and BP on arrival  91/57 mm, and he received 1 L NS in the ED with improvement in BP to 105/70. During admission, patient resting comfortably in stretcher in ED, at baseline mental status, denies any chest pain, palpitations, lightheadedness, dizziness, blurry vision. Patient admitted to cardiac tele for further work-up of syncope.       Problems discussed and associated plan:  #Syncope  - h/o cardiac arrest   - Monitor on tele   - s/p 1 L NS   - Trop: 13 --> 11  - ECG 8/3024: Sinus bradycardia with LVH, high voltage QRS in the lateral precordial leads, normal QTc  - TTE 8/30/24: LVEF 62%, normal LV size and wall thickness, RV is mildly enlarged, RVH, no valvular abnormalities, no wall motion abnormalities   - On ASA 81 mg daily, unclear reason but will continue until OSH obtained  - Pt takes Toprol 25 mg bid at home, currently HR 48-70- BB on hold   - Cardiac MRI pending to assess for infiltrative disease  - Will not order CCTA at this time because LM is visible off C, awaiting OSH records  - Obtaining records from [facility], [facility], and [facility]  - Consult EP for ILR and LifeVest versus ICD placement, eventual genetic testing   - F/u am ekgs    #Muscle spasms  - Discontinue gabapentin and amantadine, which was prescribed for post-arrest muscle spasms while at [facility]      #DVT ppx  - Early ambulation     Assessment:   24-year-old male past medical history of  VF Cardiac arrest in June 2024 prompting around a month hospital stay  at [facility] s/p being on ventillar discharged recently from LTAC in [place], hx of candida auris, presents to the ED for evaluation of syncope today, denies any post ictal or prodromal symptoms, hypotensive on arrival. s/p 500 cc with EMS , and BP on arrival  91/57 mm, and he received 1 L NS in the ED with improvement in BP to 105/70. During admission, patient resting comfortably in stretcher in ED, at baseline mental status, denies any chest pain, palpitations, lightheadedness, dizziness, blurry vision. Patient admitted to cardiac tele for further work-up of syncope.       Problems discussed and associated plan:  #Syncope, less likely seizure, concern for cardiogenic Syncope   - h/o cardiac arrest   - Monitor on tele   - s/p 1 L NS   - Trop: 13 --> 11  - ECG 8/3024: Sinus bradycardia with LVH, high voltage QRS in the lateral precordial leads, normal QTc  - TTE 8/30/24: LVEF 62%, normal LV size and wall thickness, RV is mildly enlarged, RVH, no valvular abnormalities, no wall motion abnormalities   - On ASA 81 mg daily, unclear reason but will continue until OSH obtained  - Pt takes Toprol 25 mg bid at home, currently HR 48-70- BB on hold   - Cardiac MRI pending to assess for infiltrative disease  - Will not order CCTA at this time because LM is visible off LCC, awaiting OSH records  - Obtaining records from [facility], [facility], and [facility]  - Consult EP for ILR and LifeVest versus ICD placement, eventual genetic testing       #Muscle spasms  - Discontinue gabapentin and amantadine, which was prescribed for post-arrest muscle spasms while at [facility]    # Hx of candida auris   - spoke with ID, will keep on contact precautions     #DVT ppx  - Early ambulation     # Hx of candida auris   - spoke with ID, will keep on contact precautions       Please contact me with any questions or concerns at [phone]. Assessment:   24-year-old M prior hx of Syncope, recent VF Cardiac arrest in June 2024 s/p trach prompting around a month hospital stay  at [facility], discharged recently from LTAC in [place], hx of candida auris, presents to the ED for evaluation of syncope.  hypotensive on arrival. s/p 500 cc with EMS , and BP on arrival  91/57 mm, and he received 1 L NS in the ED with improvement in BP to 105/70. 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 Pt  had another EP study reportedly as per mother.         Problems discussed and associated plan:  #Syncope  - h/o cardiac arrest   - Monitor on tele   - s/p 1 L NS   - Trop: 13 --> 11  - ECG 8/3024: Sinus bradycardia with LVH, high voltage QRS in the lateral precordial leads, normal QTc  - TTE 8/30/24: LVEF 62%, normal LV size and wall thickness, RV is mildly enlarged, RVH, no valvular abnormalities, no wall motion abnormalities   - On ASA 81 mg daily, unclear reason but will continue until OSH obtained  - Pt takes Toprol 25 mg bid at home, currently HR 48-70- BB on hold   - Cardiac MRI pending to assess for infiltrative disease  - Will not order CCTA at this time because LM is visible off C, awaiting OSH records  - Obtaining records from [facility], [facility], and [facility]  - Consult EP for ILR and LifeVest versus ICD placement, eventual genetic testing   - F/u am ekgs    #Muscle spasms  - Discontinue gabapentin and amantadine, which was prescribed for post-arrest muscle spasms while at [facility]      #DVT ppx  - Early ambulation     Assessment:   24-year-old male past medical history of  VF Cardiac arrest in June 2024 prompting around a month hospital stay  at [facility] s/p being on ventillar discharged recently from LTAC in [place], hx of candida auris, presents to the ED for evaluation of syncope today, denies any post ictal or prodromal symptoms, hypotensive on arrival. s/p 500 cc with EMS , and BP on arrival  91/57 mm, and he received 1 L NS in the ED with improvement in BP to 105/70. During admission, patient resting comfortably in stretcher in ED, at baseline mental status, denies any chest pain, palpitations, lightheadedness, dizziness, blurry vision. Patient admitted to cardiac tele for further work-up of syncope.       Problems discussed and associated plan:  #Syncope, less likely seizure, concern for cardiogenic Syncope   - h/o cardiac arrest   - Monitor on tele   - s/p 1 L NS   - Trop: 13 --> 11  - ECG 8/3024: Sinus bradycardia with LVH, high voltage QRS in the lateral precordial leads, normal QTc  - TTE 8/30/24: LVEF 62%, normal LV size and wall thickness, RV is mildly enlarged, RVH, no valvular abnormalities, no wall motion abnormalities   - On ASA 81 mg daily, unclear reason but will continue until OSH obtained  - Pt takes Toprol 25 mg bid at home, currently HR 48-70- BB on hold   - Cardiac MRI pending to assess for infiltrative disease  - Will not order CCTA at this time because LM is visible off LCC, awaiting OSH records  - Obtaining records from [facility], [facility], and [facility]  - Consult EP for ILR and LifeVest versus ICD placement, eventual genetic testing       #Muscle spasms  - Discontinue gabapentin and amantadine, which was prescribed for post-arrest muscle spasms while at [facility]    # Hx of candida auris   - spoke with ID, will keep on contact precautions     #DVT ppx  - Early ambulation     # Hx of candida auris   - spoke with ID, will keep on contact precautions       Please contact me with any questions or concerns at [phone]. Assessment:   24-year-old M prior hx of Syncope, recent VF Cardiac arrest in June 2024 s/p trach prompting around a month hospital stay  at [facility], discharged recently from LTAC in [place], hx of candida auris, presents to the ED for evaluation of syncope.  hypotensive on arrival. s/p 500 cc with EMS , and BP on arrival  91/57 mm, and he received 1 L NS in the ED with improvement in BP to 105/70. 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 Pt  had another EP study reportedly as per mother.         Problems discussed and associated plan:  #Syncope  - h/o cardiac arrest   - Monitor on tele   - s/p 1 L NS   - Trop: 13 --> 11  - ECG 8/3024: Sinus bradycardia with LVH, high voltage QRS in the lateral precordial leads, normal QTc  - TTE 8/30/24: LVEF 62%, normal LV size and wall thickness, RV is mildly enlarged, RVH, no valvular abnormalities, no wall motion abnormalities   - On ASA 81 mg daily, unclear reason but will continue until OSH obtained  - Pt takes Toprol 25 mg bid at home, currently HR 48-70- BB on hold   - Cardiac MRI pending to assess for infiltrative disease  - Will not order CCTA at this time because LM is visible off C, awaiting OSH records  - Obtaining records from [facility], [facility], and [facility]  - Consult EP for ILR and LifeVest versus ICD placement, eventual genetic testing   - U tox pending     #Muscle spasms  - Discontinue gabapentin and amantadine, which was prescribed for post-arrest muscle spasms while at [facility]      #DVT ppx  - Early ambulation     Assessment:   24-year-old male past medical history of  VF Cardiac arrest in June 2024 prompting around a month hospital stay  at [facility] s/p being on ventillar discharged recently from LTAC in [place], hx of candida auris, presents to the ED for evaluation of syncope today, denies any post ictal or prodromal symptoms, hypotensive on arrival. s/p 500 cc with EMS , and BP on arrival  91/57 mm, and he received 1 L NS in the ED with improvement in BP to 105/70. During admission, patient resting comfortably in stretcher in ED, at baseline mental status, denies any chest pain, palpitations, lightheadedness, dizziness, blurry vision. Patient admitted to cardiac tele for further work-up of syncope.       Problems discussed and associated plan:  #Syncope, less likely seizure, concern for cardiogenic Syncope   - h/o cardiac arrest   - Monitor on tele   - s/p 1 L NS   - Trop: 13 --> 11  - ECG 8/3024: Sinus bradycardia with LVH, high voltage QRS in the lateral precordial leads, normal QTc  - TTE 8/30/24: LVEF 62%, normal LV size and wall thickness, RV is mildly enlarged, RVH, no valvular abnormalities, no wall motion abnormalities   - On ASA 81 mg daily, unclear reason but will continue until OSH obtained  - Pt takes Toprol 25 mg bid at home, currently HR 48-70- BB on hold   - Cardiac MRI pending to assess for infiltrative disease  - Will not order CCTA at this time because LM is visible off LCC, awaiting OSH records  - Obtaining records from [facility], [facility], and [facility]  - Consult EP for ILR and LifeVest versus ICD placement, eventual genetic testing       #Muscle spasms  - Discontinue gabapentin and amantadine, which was prescribed for post-arrest muscle spasms while at [facility]    # Hx of candida auris   - spoke with ID, will keep on contact precautions     #DVT ppx  - Early ambulation     # Hx of candida auris   - spoke with ID, will keep on contact precautions       Please contact me with any questions or concerns at [phone]. Assessment:   24-year-old M prior hx of Syncope, recent VF Cardiac arrest in June 2024 s/p trach prompting around a month hospital stay  at [facility], discharged recently from LTAC in [place], hx of candida auris, presents to the ED for evaluation of syncope.  hypotensive on arrival. s/p 500 cc with EMS , and BP on arrival  91/57 mm, and he received 1 L NS in the ED with improvement in BP to 105/70. 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 Pt  had another EP study reportedly as per mother.       Problems discussed and associated plan:  #Syncope, less likely seizure, concern for cardiogenic Syncope   - h/o cardiac arrest   - Monitor on tele   - s/p 1 L NS   - Trop: 13 --> 11  - ECG 8/3024: Sinus bradycardia with LVH, high voltage QRS in the lateral precordial leads, normal QTc  - TTE 8/30/24: LVEF 62%, normal LV size and wall thickness, RV is mildly enlarged, RVH, no valvular abnormalities, no wall motion abnormalities   - On ASA 81 mg daily, unclear reason but will continue until OSH obtained  - Pt takes Toprol 25 mg bid at home, currently HR 48-70- BB on hold   - Cardiac MRI pending to assess for infiltrative disease  - Will not order CCTA at this time because LM is visible off C, awaiting OSH records  - Obtaining records from [facility], [facility], and [facility]  - Consult EP for ILR and LifeVest versus ICD placement, eventual genetic testing   - f/u utox       #Muscle spasms  - Discontinue gabapentin and amantadine, which was prescribed for post-arrest muscle spasms while at [facility]      #DVT ppx  - Early ambulation  - Heparin SubQ started      # Hx of candida auris   - spoke with ID, will keep on contact precautions       Please contact me with any questions or concerns at [phone].

## 2024-09-01 PROCEDURE — 99235 HOSP IP/OBS SAME DATE MOD 70: CPT | Mod: 25

## 2024-09-01 PROCEDURE — 99232 SBSQ HOSP IP/OBS MODERATE 35: CPT

## 2024-09-01 RX ADMIN — Medication 81 MILLIGRAM(S): at 11:16

## 2024-09-01 RX ADMIN — Medication 5000 UNIT(S): at 17:34

## 2024-09-01 RX ADMIN — Medication 5000 UNIT(S): at 05:11

## 2024-09-01 NOTE — PROGRESS NOTE ADULT - SUBJECTIVE AND OBJECTIVE BOX
Chief complaint: Patient is a 24y old  Male who presents with a chief complaint of Syncope (30 Aug 2024 01:53)    Interval history: Patient seen and examined this AM with mother at bedside. No overnight events.      Review of systems: A complete 10-point review of systems was obtained and is negative except as stated in the interval history.    Vitals:  ICU Vital Signs Last 24 Hrs  T(C): 36.5 (01 Sep 2024 07:47), Max: 36.9 (31 Aug 2024 16:51)  T(F): 97.7 (01 Sep 2024 07:47), Max: 98.4 (31 Aug 2024 16:51)  HR: 68 (01 Sep 2024 07:47) (60 - 81)  BP: 105/74 (01 Sep 2024 07:47) (104/59 - 114/64)  BP(mean): 86 (01 Sep 2024 07:47) (81 - 86)  RR: 18 (01 Sep 2024 07:47) (18 - 18)  SpO2: 97% (01 Sep 2024 07:47) (97% - 97%)    O2 Parameters below as of 01 Sep 2024 07:47  Patient On (Oxygen Delivery Method): room air                Ins & outs:     30 Aug 2024 07:01  -  31 Aug 2024 07:00  --------------------------------------------------------  IN: 450 mL / OUT: 750 mL / NET: -300 mL    31 Aug 2024 07:01  -  31 Aug 2024 09:20  --------------------------------------------------------  IN: 210 mL / OUT: 1200 mL / NET: -990 mL          Weight trend:  Weight (kg): 65.3 (08-30)    Physical exam:  General: No apparent distress  HEENT: Anicteric sclera. Moist mucous membranes. JVP negative   Cardiac: Regular rate and rhythm. No murmurs, rubs, or gallops.   Vascular: Symmetric radial pulses. Dorsalis pedis pulses palpable.   Respiratory: Normal effort. Clear to ascultation.   Abdomen: Soft, nontender. Audible bowel sounds.   Extremities: Warm with no edema. No cyanosis or clubbing.   Skin: Warm and dry. No rash.   Neurologic: Grossly normal motor function.   Psychiatric: Oriented to person, place, and time.     Data reviewed:  - Telemetry: NS HR ranging from  40-70, no arrhythmias noted      - ECG (8/30/24):   Diagnosis Line Sinus bradycardia  Voltage criteria for left ventricular hypertrophy  ST elevation, consider early repolarization  Abnormal ECG    - TTE:   < from: TTE Echo Complete w/o Contrast w/ Doppler (08.30.24 @ 08:27) >  PHYSICIAN INTERPRETATION:  Left Ventricle: The left ventricular internal cavity size is normal. Left   ventricular wall thickness is normal. Global LV systolic function was   normal. Spectral Doppler shows normal pattern of LV diastolic filling.   Normal LV filling pressures.  Right Ventricle: The right ventricular size is mildly enlarged. RV   systolic function is normal.  Left Atrium: Normal left atrial size.  Right Atrium: Normal right atrial size.  Pericardium: Trivial pericardial effusion is present.  Mitral Valve: Structurally normal mitral valve, with normal leaflet   excursion. No evidence of mitral stenosis. Trace mitral valve   regurgitation is seen.  Tricuspid Valve: Structurally normal tricuspid valve, with normal leaflet   excursion. Trivial tricuspidregurgitation is visualized.  Aortic Valve: Normal trileaflet aortic valve with normal opening. No   evidence of aortic stenosis. No evidence of aortic valve regurgitation is   seen.  Pulmonic Valve: Structurally normal pulmonic valve, with normal leaflet   excursion. Trace pulmonic valve regurgitation.  Aorta: The aortic root and ascending aorta are structurally normal, with   no evidence of dilitation.  Pulmonary Artery: Normal pulmonary artery pressure.  Venous: The inferior vena cava was normal sized, with respiratory size   variation greater than 50%.  In comparison to the previous echocardiogram(s): There are no prior   studies on this patient for comparison purposes.            - Chest x-ray (8/29/24):   Impression:  No radiographic evidence of acute cardiopulmonary disease.            - Labs:                        12.5   6.32  )-----------( 211      ( 31 Aug 2024 05:42 )             37.5     08-31    140  |  104  |  9<L>  ----------------------------<  87  3.9   |  26  |  0.8    Ca    9.3      31 Aug 2024 05:42  Mg     1.9     08-31                  Lactate Trend    Urinalysis Basic - ( 31 Aug 2024 05:42 )    Color: x / Appearance: x / SG: x / pH: x  Gluc: 87 mg/dL / Ketone: x  / Bili: x / Urobili: x   Blood: x / Protein: x / Nitrite: x   Leuk Esterase: x / RBC: x / WBC x   Sq Epi: x / Non Sq Epi: x / Bacteria: x          Medications:  MEDICATIONS  (STANDING):  aspirin enteric coated 81 milliGRAM(s) Oral daily  heparin   Injectable 5000 Unit(s) SubCutaneous every 12 hours    MEDICATIONS  (PRN):  acetaminophen     Tablet .. 650 milliGRAM(s) Oral every 6 hours PRN Moderate Pain (4 - 6)      Allergies    No Known Allergies    Intolerances

## 2024-09-01 NOTE — PROGRESS NOTE ADULT - ASSESSMENT
Assessment:   24-year-old M prior hx of Syncope, recent VF Cardiac arrest in June 2024 s/p trach prompting around a month hospital stay  at [facility], discharged recently from LTAC in [place], hx of candida auris, presents to the ED for evaluation of syncope.  hypotensive on arrival. s/p 500 cc with EMS , and BP on arrival  91/57 mm, and he received 1 L NS in the ED with improvement in BP to 105/70. 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 Pt  had another EP study reportedly as per mother.       Problems discussed and associated plan:  #Syncope, less likely seizure, concern for cardiogenic Syncope   - h/o cardiac arrest   - Monitor on tele   - s/p 1 L NS   - Trop: 13 --> 11  - ECG 8/3024: Sinus bradycardia with LVH, high voltage QRS in the lateral precordial leads, normal QTc  - TTE 8/30/24: LVEF 62%, normal LV size and wall thickness, RV is mildly enlarged, RVH, no valvular abnormalities, no wall motion abnormalities   - On ASA 81 mg daily, unclear reason but will continue until OSH obtained  - Pt takes Toprol 25 mg bid at home, currently HR 48-70- BB on hold   - Cardiac MRI pending to assess for infiltrative disease  - Will not order CCTA at this time because LM is visible off C, awaiting OSH records  - Obtaining records from [facility]  - Consult EP for ILR and LifeVest versus ICD placement, eventual genetic testing   - f/u utox   - EP and Neuro recs appreciated  - F/u rEEG     #Muscle spasms  - Discontinue gabapentin and amantadine, which was prescribed for post-arrest muscle spasms while at [facility]    #DVT ppx  - Early ambulation  - Heparin SubQ started      # Hx of candida auris   - spoke with ID, will keep on contact precautions       Please contact me with any questions or concerns at [phone].

## 2024-09-02 LAB
ANION GAP SERPL CALC-SCNC: 11 MMOL/L — SIGNIFICANT CHANGE UP (ref 7–14)
BUN SERPL-MCNC: 12 MG/DL — SIGNIFICANT CHANGE UP (ref 10–20)
CALCIUM SERPL-MCNC: 10.2 MG/DL — SIGNIFICANT CHANGE UP (ref 8.4–10.5)
CHLORIDE SERPL-SCNC: 102 MMOL/L — SIGNIFICANT CHANGE UP (ref 98–110)
CO2 SERPL-SCNC: 28 MMOL/L — SIGNIFICANT CHANGE UP (ref 17–32)
CREAT SERPL-MCNC: 0.9 MG/DL — SIGNIFICANT CHANGE UP (ref 0.7–1.5)
EGFR: 124 ML/MIN/1.73M2 — SIGNIFICANT CHANGE UP
GLUCOSE SERPL-MCNC: 89 MG/DL — SIGNIFICANT CHANGE UP (ref 70–99)
HCT VFR BLD CALC: 41.8 % — LOW (ref 42–52)
HGB BLD-MCNC: 13.8 G/DL — LOW (ref 14–18)
MAGNESIUM SERPL-MCNC: 1.9 MG/DL — SIGNIFICANT CHANGE UP (ref 1.8–2.4)
MCHC RBC-ENTMCNC: 30.3 PG — SIGNIFICANT CHANGE UP (ref 27–31)
MCHC RBC-ENTMCNC: 33 G/DL — SIGNIFICANT CHANGE UP (ref 32–37)
MCV RBC AUTO: 91.7 FL — SIGNIFICANT CHANGE UP (ref 80–94)
NRBC # BLD: 0 /100 WBCS — SIGNIFICANT CHANGE UP (ref 0–0)
PLATELET # BLD AUTO: 239 K/UL — SIGNIFICANT CHANGE UP (ref 130–400)
PMV BLD: 9.3 FL — SIGNIFICANT CHANGE UP (ref 7.4–10.4)
POTASSIUM SERPL-MCNC: 4.5 MMOL/L — SIGNIFICANT CHANGE UP (ref 3.5–5)
POTASSIUM SERPL-SCNC: 4.5 MMOL/L — SIGNIFICANT CHANGE UP (ref 3.5–5)
RBC # BLD: 4.56 M/UL — LOW (ref 4.7–6.1)
RBC # FLD: 13 % — SIGNIFICANT CHANGE UP (ref 11.5–14.5)
SODIUM SERPL-SCNC: 141 MMOL/L — SIGNIFICANT CHANGE UP (ref 135–146)
WBC # BLD: 6.97 K/UL — SIGNIFICANT CHANGE UP (ref 4.8–10.8)
WBC # FLD AUTO: 6.97 K/UL — SIGNIFICANT CHANGE UP (ref 4.8–10.8)

## 2024-09-02 PROCEDURE — 99232 SBSQ HOSP IP/OBS MODERATE 35: CPT

## 2024-09-02 PROCEDURE — 99233 SBSQ HOSP IP/OBS HIGH 50: CPT

## 2024-09-02 PROCEDURE — 95819 EEG AWAKE AND ASLEEP: CPT | Mod: 26

## 2024-09-02 RX ADMIN — Medication 81 MILLIGRAM(S): at 11:55

## 2024-09-02 RX ADMIN — Medication 5000 UNIT(S): at 17:51

## 2024-09-02 NOTE — PROGRESS NOTE ADULT - ASSESSMENT
Assessment:   24-year-old M prior hx of Syncope, recent VF Cardiac arrest in June 2024 s/p trach prompting around a month hospital stay  at [facility], discharged recently from LTAC in [place], hx of candida auris, presents to the ED for evaluation of syncope.  hypotensive on arrival. s/p 500 cc with EMS , and BP on arrival  91/57 mm, and he received 1 L NS in the ED with improvement in BP to 105/70. 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 Pt  had another EP study reportedly as per mother.       Problems discussed and associated plan:  #Syncope, less likely seizure, concern for cardiogenic Syncope   - h/o cardiac arrest   - Monitor on tele   - s/p 1 L NS   - Trop: 13 --> 11  - ECG 8/3024: Sinus bradycardia with LVH, high voltage QRS in the lateral precordial leads, normal QTc  - TTE 8/30/24: LVEF 62%, normal LV size and wall thickness, RV is mildly enlarged, RVH, no valvular abnormalities, no wall motion abnormalities   - On ASA 81 mg daily, unclear reason but will continue until OSH obtained  - Pt takes Toprol 25 mg bid at home, currently HR 48-70- BB on hold   - Cardiac MRI pending to assess for infiltrative disease  - Will not order CCTA at this time because LM is visible off C, awaiting OSH records  - Obtaining records from [facility]  - Consult EP for ILR and LifeVest versus ICD placement, eventual genetic testing   - f/u utox   - EP and Neuro recs appreciated  - F/u rEEG     #Muscle spasms  - Discontinue gabapentin and amantadine, which was prescribed for post-arrest muscle spasms while at [facility]    #DVT ppx  - Early ambulation  - Heparin SubQ started      # Hx of candida auris   - spoke with ID, will keep on contact precautions       Please contact me with any questions or concerns at [phone]. Assessment:   24 year old male with FHx of father who  in his sleep at age of 33 (no autopsy or genetic testing), and PMHx of recent cardiac arrest requiring ~30 mins of chest compressions and a prolonged hospital stay at [facility] who presents with syncope. 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 Pt  had another EP study reportedly as per mother. Admitted to cardiac telemetry for further work up.       Problems discussed and associated plan:  #Syncope, less likely seizure, concern for cardiogenic Syncope   - h/o cardiac arrest   - ECG 3024: Sinus bradycardia with LVH, high voltage QRS in the lateral precordial leads, normal QTc  - TTE 24: LVEF 62%, normal LV size and wall thickness, RV is mildly enlarged, RVH, no valvular abnormalities, no wall motion abnormalities   - On ASA 81 mg daily, unclear reason but will continue until OSH obtained  - Continue to hold off BB due to bradycardia   - NPO after MN for cardiac MRI to assess for infiltrative disease   - EEG on  negative   - Pending video EEG   - EP and neuro are following   - Still pending cardiac MRI and EPS results from [facility]s medical records.     #Muscle spasms  - Discontinue gabapentin and amantadine, which was prescribed for post-arrest muscle spasms while at [facility]  - resolved     #DVT ppx  - OOB daily   - Heparin SQ    # Hx of candida auris   - spoke with ID, will keep on contact precautions     Please contact me with any questions or concerns at [phone]. Assessment:   24 year old male with FHx of father who  in his sleep at age of 33 (no autopsy or genetic testing), and PMHx of recent cardiac arrest requiring ~30 mins of chest compressions and a prolonged hospital stay at [facility] who presents with syncope. 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 Pt  had another EP study reportedly as per mother. Admitted to cardiac telemetry for further work up.     Problems discussed and associated plan:  #Syncope, less likely seizure, concern for cardiogenic Syncope   - h/o cardiac arrest   - ECG 3024: Sinus bradycardia with LVH, high voltage QRS in the lateral precordial leads, normal QTc  - TTE 24: LVEF 62%, normal LV size and wall thickness, RV is mildly enlarged, RVH, no valvular abnormalities, no wall motion abnormalities   - On ASA 81 mg daily, unclear reason but will continue until OSH obtained  - Continue to hold off BB due to bradycardia   - NPO after MN for cardiac MRI to assess for infiltrative disease   - Obtain TSH and FT4   - EEG on  negative   - Pending video EEG   - EP and neuro are following   - Still pending cardiac MRI and EPS results from [facility]'s medical records.   - Plan for EPS on     #Muscle spasms  - Discontinue gabapentin and amantadine, which was prescribed for post-arrest muscle spasms while at [facility]  - resolved     #DVT ppx  - OOB daily   - Heparin SQ    # Hx of candida auris   - spoke with ID, will keep on contact precautions     Please contact me with any questions or concerns at [phone].

## 2024-09-02 NOTE — PROGRESS NOTE ADULT - SUBJECTIVE AND OBJECTIVE BOX
Chief complaint: Patient is a 24y old  Male who presents with a chief complaint of Syncope (30 Aug 2024 01:53)    Interval history:     Review of systems: A complete 10-point review of systems was obtained and is negative except as stated in the interval history.    Vitals:  Vital Signs Last 24 Hrs  T(C): 36.7 (02 Sep 2024 07:28), Max: 36.9 (01 Sep 2024 15:26)  T(F): 98.1 (02 Sep 2024 07:28), Max: 98.5 (01 Sep 2024 15:26)  HR: 76 (02 Sep 2024 08:37) (48 - 83)  BP: 125/74 (02 Sep 2024 08:37) (97/66 - 125/74)  BP(mean): 92 (02 Sep 2024 08:37) (75 - 92)  RR: 18 (02 Sep 2024 00:24) (18 - 18)  SpO2: 98% (02 Sep 2024 00:24) (98% - 98%)    Ins & outs:     09-01 @ 07:01  -  09-02 @ 07:00  --------------------------------------------------------  IN: 0 mL / OUT: 625 mL / NET: -625 mL      Weight trend:  Weight (kg): 65.3 (08-30)    Physical exam:  General: No apparent distress  HEENT: Anicteric sclera. Moist mucous membranes. JVP negative   Cardiac: Regular rate and rhythm. No murmurs, rubs, or gallops.   Vascular: Symmetric radial pulses. Dorsalis pedis pulses palpable.   Respiratory: Normal effort. Clear to ascultation.   Abdomen: Soft, nontender. Audible bowel sounds.   Extremities: Warm with no edema. No cyanosis or clubbing.   Skin: Warm and dry. No rash.   Neurologic: Grossly normal motor function.   Psychiatric: Oriented to person, place, and time.     Data reviewed:  - Telemetry:     - ECG (8/30/24):   Diagnosis Line Sinus bradycardia  Voltage criteria for left ventricular hypertrophy  ST elevation, consider early repolarization  Abnormal ECG    - TTE:   < from: TTE Echo Complete w/o Contrast w/ Doppler (08.30.24 @ 08:27) >  PHYSICIAN INTERPRETATION:  Left Ventricle: The left ventricular internal cavity size is normal. Left   ventricular wall thickness is normal. Global LV systolic function was   normal. Spectral Doppler shows normal pattern of LV diastolic filling.   Normal LV filling pressures.  Right Ventricle: The right ventricular size is mildly enlarged. RV   systolic function is normal.  Left Atrium: Normal left atrial size.  Right Atrium: Normal right atrial size.  Pericardium: Trivial pericardial effusion is present.  Mitral Valve: Structurally normal mitral valve, with normal leaflet   excursion. No evidence of mitral stenosis. Trace mitral valve   regurgitation is seen.  Tricuspid Valve: Structurally normal tricuspid valve, with normal leaflet   excursion. Trivial tricuspidregurgitation is visualized.  Aortic Valve: Normal trileaflet aortic valve with normal opening. No   evidence of aortic stenosis. No evidence of aortic valve regurgitation is   seen.  Pulmonic Valve: Structurally normal pulmonic valve, with normal leaflet   excursion. Trace pulmonic valve regurgitation.  Aorta: The aortic root and ascending aorta are structurally normal, with   no evidence of dilitation.  Pulmonary Artery: Normal pulmonary artery pressure.  Venous: The inferior vena cava was normal sized, with respiratory size   variation greater than 50%.  In comparison to the previous echocardiogram(s): There are no prior   studies on this patient for comparison purposes.      - Chest x-ray (8/29/24):   Impression:  No radiographic evidence of acute cardiopulmonary disease.      - Labs:                        13.8   6.97  )-----------( 239      ( 02 Sep 2024 05:24 )             41.8     09-02    141  |  102  |  12  ----------------------------<  89  4.5   |  28  |  0.9    Ca    10.2      02 Sep 2024 05:24  Mg     1.9     09-02    Lactate Trend    Urinalysis Basic - ( 02 Sep 2024 05:24 )    Color: x / Appearance: x / SG: x / pH: x  Gluc: 89 mg/dL / Ketone: x  / Bili: x / Urobili: x   Blood: x / Protein: x / Nitrite: x   Leuk Esterase: x / RBC: x / WBC x   Sq Epi: x / Non Sq Epi: x / Bacteria: x    MEDICATIONS  (STANDING):  aspirin enteric coated 81 milliGRAM(s) Oral daily  heparin   Injectable 5000 Unit(s) SubCutaneous every 12 hours    MEDICATIONS  (PRN):  acetaminophen     Tablet .. 650 milliGRAM(s) Oral every 6 hours PRN Moderate Pain (4 - 6)   Chief complaint: Patient is a 24y old  Male who presents with a chief complaint of Syncope (30 Aug 2024 01:53)    Interval history: Seen and examined patient at bedside this AM. Patient accompanied mother at bedside, NAD. Feels well without any complaints. No acute overnight events.     Review of systems: A complete 10-point review of systems was obtained and is negative except as stated in the interval history.    Vitals:  Vital Signs Last 24 Hrs  T(C): 36.7 (02 Sep 2024 07:28), Max: 36.9 (01 Sep 2024 15:26)  T(F): 98.1 (02 Sep 2024 07:28), Max: 98.5 (01 Sep 2024 15:26)  HR: 76 (02 Sep 2024 08:37) (48 - 83)  BP: 125/74 (02 Sep 2024 08:37) (97/66 - 125/74)  BP(mean): 92 (02 Sep 2024 08:37) (75 - 92)  RR: 18 (02 Sep 2024 00:24) (18 - 18)  SpO2: 98% (02 Sep 2024 00:24) (98% - 98%)      Ins & outs:     09-01 @ 07:01  -  09-02 @ 07:00  --------------------------------------------------------  IN: 0 mL / OUT: 625 mL / NET: -625 mL        Weight trend:  Weight (kg): 65.3 (08-30)    Physical exam:  General: No apparent distress  HEENT: Anicteric sclera. Moist mucous membranes. JVP negative   Cardiac: Regular rate and rhythm. No murmurs, rubs, or gallops.   Vascular: Symmetric radial pulses. Dorsalis pedis pulses palpable.   Respiratory: Normal effort. Clear to ascultation.   Abdomen: Soft, nontender. Audible bowel sounds.   Extremities: Warm with no edema. No cyanosis or clubbing.   Skin: Warm and dry. No rash.   Neurologic: Grossly normal motor function.   Psychiatric: Oriented to person, place, and time.     Data reviewed:  - Telemetry: SB/SR HR 47-87, HR as low as 37 on tele.     - ECG (8/30/24):   Diagnosis Line Sinus bradycardia  Voltage criteria for left ventricular hypertrophy  ST elevation, consider early repolarization  Abnormal ECG    - TTE:   < from: TTE Echo Complete w/o Contrast w/ Doppler (08.30.24 @ 08:27) >  PHYSICIAN INTERPRETATION:  Left Ventricle: The left ventricular internal cavity size is normal. Left   ventricular wall thickness is normal. Global LV systolic function was   normal. Spectral Doppler shows normal pattern of LV diastolic filling.   Normal LV filling pressures.  Right Ventricle: The right ventricular size is mildly enlarged. RV   systolic function is normal.  Left Atrium: Normal left atrial size.  Right Atrium: Normal right atrial size.  Pericardium: Trivial pericardial effusion is present.  Mitral Valve: Structurally normal mitral valve, with normal leaflet   excursion. No evidence of mitral stenosis. Trace mitral valve   regurgitation is seen.  Tricuspid Valve: Structurally normal tricuspid valve, with normal leaflet   excursion. Trivial tricuspidregurgitation is visualized.  Aortic Valve: Normal trileaflet aortic valve with normal opening. No   evidence of aortic stenosis. No evidence of aortic valve regurgitation is   seen.  Pulmonic Valve: Structurally normal pulmonic valve, with normal leaflet   excursion. Trace pulmonic valve regurgitation.  Aorta: The aortic root and ascending aorta are structurally normal, with   no evidence of dilitation.  Pulmonary Artery: Normal pulmonary artery pressure.  Venous: The inferior vena cava was normal sized, with respiratory size   variation greater than 50%.  In comparison to the previous echocardiogram(s): There are no prior   studies on this patient for comparison purposes.      - Chest x-ray (8/29/24):   Impression:  No radiographic evidence of acute cardiopulmonary disease.      - Labs:                        13.8   6.97  )-----------( 239      ( 02 Sep 2024 05:24 )             41.8     09-02    141  |  102  |  12  ----------------------------<  89  4.5   |  28  |  0.9    Ca    10.2      02 Sep 2024 05:24  Mg     1.9     09-02      Lactate Trend    Urinalysis Basic - ( 02 Sep 2024 05:24 )    Color: x / Appearance: x / SG: x / pH: x  Gluc: 89 mg/dL / Ketone: x  / Bili: x / Urobili: x   Blood: x / Protein: x / Nitrite: x   Leuk Esterase: x / RBC: x / WBC x   Sq Epi: x / Non Sq Epi: x / Bacteria: x    MEDICATIONS  (STANDING):  aspirin enteric coated 81 milliGRAM(s) Oral daily  heparin   Injectable 5000 Unit(s) SubCutaneous every 12 hours    MEDICATIONS  (PRN):  acetaminophen     Tablet .. 650 milliGRAM(s) Oral every 6 hours PRN Moderate Pain (4 - 6)

## 2024-09-02 NOTE — PROGRESS NOTE ADULT - SUBJECTIVE AND OBJECTIVE BOX
INTERVAL HPI/OVERNIGHT EVENTS:  no events on tele  no new records    MEDICATIONS  (STANDING):  aspirin enteric coated 81 milliGRAM(s) Oral daily  heparin   Injectable 5000 Unit(s) SubCutaneous every 12 hours    MEDICATIONS  (PRN):  acetaminophen     Tablet .. 650 milliGRAM(s) Oral every 6 hours PRN Moderate Pain (4 - 6)      Allergies    No Known Allergies    Intolerances        REVIEW OF SYSTEMS    [x ] A ten-point review of systems was otherwise negative except as noted.  [ ] Due to altered mental status/intubation, subjective information were not able to be obtained from the patient. History was obtained, to the extent possible, from review of the chart and collateral sources of information.      Vital Signs Last 24 Hrs  T(C): 36.7 (02 Sep 2024 07:28), Max: 36.9 (02 Sep 2024 00:24)  T(F): 98.1 (02 Sep 2024 07:28), Max: 98.4 (02 Sep 2024 00:24)  HR: 76 (02 Sep 2024 08:37) (48 - 83)  BP: 125/74 (02 Sep 2024 08:37) (97/66 - 125/74)  BP(mean): 92 (02 Sep 2024 08:37) (76 - 92)  RR: 18 (02 Sep 2024 00:24) (18 - 18)  SpO2: 98% (02 Sep 2024 00:24) (98% - 98%)        09-01-24 @ 07:01  -  09-02-24 @ 07:00  --------------------------------------------------------  IN: 0 mL / OUT: 625 mL / NET: -625 mL        PHYSICAL EXAM:    GENERAL: In no apparent distress, well nourished, and hydrated.  HEART: Regular rate and rhythm; No murmur; NO rubs, or gallops.  PULMONARY: Clear to auscultation and percussion.  Normal expansion/effort. No rales, wheezing, or rhonchi bilaterally.  ABDOMEN: Soft, Nontender, Nondistended; Bowel sounds present  EXTREMITIES:  Extremities warm, pink, well-perfused, 2+ Peripheral Pulses, No clubbing, cyanosis, or edema  NEUROLOGICAL: alert & oriented x 3, no focal deficits, [facility]    LABS:                        13.8   6.97  )-----------( 239      ( 02 Sep 2024 05:24 )             41.8     09-02    141  |  102  |  12  ----------------------------<  89  4.5   |  28  |  0.9    Ca    10.2      02 Sep 2024 05:24  Mg     1.9     09-02                  RADIOLOGY & ADDITIONAL TESTS:

## 2024-09-02 NOTE — PROGRESS NOTE ADULT - ASSESSMENT
23yo with family h/o SCD, h/o VFib arrest, EP study and ablation, admitted with syncopal episode  Old EKG 4/2023 suggest presence of WPW, not present on this admission EKG. Early repolarization present on current EKG    syncope  arrhythmia, ?WPW, s/p ablation  h/o VF arest  FHx SCD      con't tele  please obtain records from [facility]: cardiac MRI, EP study/ablation, repeat EP study  Check TSH / Free T4   Maintain electrolytes K>4.0 Mg >2.5   cardiac MRI unless was done at outside hospital  plan EP study on Wed  --> if negative ILR placement  --> if positive will discuss ICD placement  NPO for Wed  will follow   23yo with family h/o SCD, h/o VFib arrest, EP study and ablation, admitted with syncopal episode  Old EKG 4/2023 suggest presence of WPW, not present on this admission EKG. Early repolarization present on current EKG    syncope  arrhythmia, ?WPW, s/p ablation  h/o VF arrest  FHx SCD      con't tele  please obtain records from [facility]: cardiac MRI, EP study/ablation, repeat EP study  Check TSH / Free T4   Maintain electrolytes K>4.0 Mg >2.5   cardiac MRI unless was done at outside hospital  plan EP study on Wed  --> if negative ILR placement  --> if positive will discuss ICD placement  NPO for Wed  will follow

## 2024-09-03 LAB
AMPHET UR-MCNC: NEGATIVE NG/ML — SIGNIFICANT CHANGE UP
ANION GAP SERPL CALC-SCNC: 14 MMOL/L — SIGNIFICANT CHANGE UP (ref 7–14)
BARBITURATES UR QL SCN: NEGATIVE NG/ML — SIGNIFICANT CHANGE UP
BARBITURATES UR-MCNC: NEGATIVE NG/ML — SIGNIFICANT CHANGE UP
BENZODIAZ UR-MCNC: NEGATIVE NG/ML — SIGNIFICANT CHANGE UP
BUN SERPL-MCNC: 15 MG/DL — SIGNIFICANT CHANGE UP (ref 10–20)
CALCIUM SERPL-MCNC: 9.9 MG/DL — SIGNIFICANT CHANGE UP (ref 8.4–10.4)
CHLORIDE SERPL-SCNC: 99 MMOL/L — SIGNIFICANT CHANGE UP (ref 98–110)
CO2 SERPL-SCNC: 24 MMOL/L — SIGNIFICANT CHANGE UP (ref 17–32)
COCAINE METAB.OTHER UR-MCNC: NEGATIVE NG/ML — SIGNIFICANT CHANGE UP
CREAT SERPL-MCNC: 0.9 MG/DL — SIGNIFICANT CHANGE UP (ref 0.7–1.5)
CREATININE, URINE THERAPEUTIC: 41.8 MG/DL — SIGNIFICANT CHANGE UP (ref 20–300)
EGFR: 124 ML/MIN/1.73M2 — SIGNIFICANT CHANGE UP
FENTANYL UR QL SCN: NEGATIVE NG/ML — SIGNIFICANT CHANGE UP
GLUCOSE SERPL-MCNC: 85 MG/DL — SIGNIFICANT CHANGE UP (ref 70–99)
HCT VFR BLD CALC: 42.3 % — SIGNIFICANT CHANGE UP (ref 42–52)
HGB BLD-MCNC: 13.9 G/DL — LOW (ref 14–18)
MAGNESIUM SERPL-MCNC: 1.9 MG/DL — SIGNIFICANT CHANGE UP (ref 1.8–2.4)
MCHC RBC-ENTMCNC: 30.2 PG — SIGNIFICANT CHANGE UP (ref 27–31)
MCHC RBC-ENTMCNC: 32.9 G/DL — SIGNIFICANT CHANGE UP (ref 32–37)
MCV RBC AUTO: 91.8 FL — SIGNIFICANT CHANGE UP (ref 80–94)
METHADONE UR QL SCN: NEGATIVE NG/ML — SIGNIFICANT CHANGE UP
NRBC # BLD: 0 /100 WBCS — SIGNIFICANT CHANGE UP (ref 0–0)
OPIATES UR-MCNC: NEGATIVE NG/ML — SIGNIFICANT CHANGE UP
OXYCODONE UR QL SCN: NEGATIVE NG/ML — SIGNIFICANT CHANGE UP
PCP UR-MCNC: NEGATIVE NG/ML — SIGNIFICANT CHANGE UP
PH, URINE RESULT: 6.8 — SIGNIFICANT CHANGE UP (ref 4.5–8.9)
PLATELET # BLD AUTO: 231 K/UL — SIGNIFICANT CHANGE UP (ref 130–400)
PMV BLD: 9.8 FL — SIGNIFICANT CHANGE UP (ref 7.4–10.4)
POTASSIUM SERPL-MCNC: 4.3 MMOL/L — SIGNIFICANT CHANGE UP (ref 3.5–5)
POTASSIUM SERPL-SCNC: 4.3 MMOL/L — SIGNIFICANT CHANGE UP (ref 3.5–5)
RBC # BLD: 4.61 M/UL — LOW (ref 4.7–6.1)
RBC # FLD: 12.8 % — SIGNIFICANT CHANGE UP (ref 11.5–14.5)
SODIUM SERPL-SCNC: 137 MMOL/L — SIGNIFICANT CHANGE UP (ref 135–146)
T4 FREE SERPL-MCNC: 1.1 NG/DL — SIGNIFICANT CHANGE UP (ref 0.9–1.8)
THC UR QL: NEGATIVE NG/ML — SIGNIFICANT CHANGE UP
TSH SERPL-MCNC: 3.8 UIU/ML — SIGNIFICANT CHANGE UP (ref 0.27–4.2)
WBC # BLD: 6.76 K/UL — SIGNIFICANT CHANGE UP (ref 4.8–10.8)
WBC # FLD AUTO: 6.76 K/UL — SIGNIFICANT CHANGE UP (ref 4.8–10.8)

## 2024-09-03 PROCEDURE — 95720 EEG PHY/QHP EA INCR W/VEEG: CPT

## 2024-09-03 PROCEDURE — 75561 CARDIAC MRI FOR MORPH W/DYE: CPT | Mod: 26

## 2024-09-03 PROCEDURE — 99232 SBSQ HOSP IP/OBS MODERATE 35: CPT

## 2024-09-03 RX ORDER — CHLORHEXIDINE GLUCONATE 40 MG/ML
1 SOLUTION TOPICAL
Refills: 0 | Status: DISCONTINUED | OUTPATIENT
Start: 2024-09-03 | End: 2024-09-06

## 2024-09-03 RX ADMIN — Medication 5000 UNIT(S): at 06:00

## 2024-09-03 RX ADMIN — Medication 5000 UNIT(S): at 18:23

## 2024-09-03 RX ADMIN — Medication 81 MILLIGRAM(S): at 11:04

## 2024-09-03 NOTE — EEG REPORT - NS EEG TEXT BOX
Patient Name:	MARCELINO RODRIGUEZ    :	2000  MRN:	-  Study Date/Time:	2024, 12:23:27 PM  Referred by:	-    Brief Clinical History:  MARCELINO RODRIGUEZ is a 24 year old Male; study performed to investigate for seizures or markers of epilepsy.   Diagnosis Code: R55 syncope and collapse  CPT:  57884 (awake/sleep)     Patient Medication:  Ecotrin    Tylenol    Heparin      Acquisition Details:  Electroencephalography was acquired using a minimum of 21 channels on an [employer] system v 9.3.1 with electrode placement according to the standard International 10-20 system following ACNS (American Clinical Neurophysiology Society) guidelines.  Anterior temporal T1 and T2 electrodes were utilized whenever possible.   The [employer] automated spike & seizure detections were all reviewed in detail, in addition to the entire raw EEG.    Findings:  Background:  continuous.   voltage:  Normal (20uV)  Organization:  Appropriate anterior-posterior gradient  Posterior Dominant Rhythm:  9 Hz symmetric, well-organized, and well-modulated  Variability:  Yes	  Reactivity:  Yes  Sleep:  Symmetric, synchronous spindles and K complexes.  Focal abnormalities:  No persistent asymmetries of voltage or frequency.  Interictal Activity:  None  Location:    Focal Slowing:  None  Generalized Slowing:  No  Events:  1)	No electrographic seizures or significant clinical events.  Provocations:  1)	Hyperventilation: was not performed.  2)	Photic stimulation: was not performed.  Impression:  normal    Clinical Correlation:  Normal study does not exclude diagnosis of seizure disorder    Yogi Michel MD  Attending Neurologist, Division of Epilepsy  
Epilepsy Attending Note:     MARCELINO RODRIGUEZ    24y Male  MRN [id number]    Vital Signs Last 24 Hrs  T(C): 36.4 (02 Sep 2024 16:52), Max: 36.4 (02 Sep 2024 16:52)  T(F): 97.5 (02 Sep 2024 16:52), Max: 97.5 (02 Sep 2024 16:52)  HR: 55 (03 Sep 2024 07:52) (52 - 69)  BP: 109/71 (03 Sep 2024 07:52) (109/69 - 128/77)  BP(mean): 84 (03 Sep 2024 07:52) (84 - 97)  RR: 18 (03 Sep 2024 05:07) (18 - 18)  SpO2: 93% (03 Sep 2024 05:07) (93% - 98%)    Parameters below as of 03 Sep 2024 05:07  Patient On (Oxygen Delivery Method): room air                              13.9   6.76  )-----------( 231      ( 03 Sep 2024 05:14 )             42.3       09-03    137  |  99  |  15  ----------------------------<  85  4.3   |  24  |  0.9    Ca    9.9      03 Sep 2024 05:14  Mg     1.9     09-03        MEDICATIONS  (STANDING):  aspirin enteric coated 81 milliGRAM(s) Oral daily  chlorhexidine 2% Cloths 1 Application(s) Topical <User Schedule>  heparin   Injectable 5000 Unit(s) SubCutaneous every 12 hours    MEDICATIONS  (PRN):  acetaminophen     Tablet .. 650 milliGRAM(s) Oral every 6 hours PRN Moderate Pain (4 - 6)            VEEG in the last 24 hours:    Background----------  continues , symmetrical, low amplitude showing good level f organization and reactivity and reaching 8-9 hz . the rrecording shows night stages of sleep . It is of note that the recording is contaminated by movement artifact    Focal and generalized slowing---------none    Interictal activity------  none    Events----------  none    Seizures--- none    Impression: abnormal as above    Plan - as/neurology

## 2024-09-03 NOTE — OCCUPATIONAL THERAPY INITIAL EVALUATION ADULT - NS ASR FOLLOW COMMAND OT EVAL
mild delay in processing - mother reports since cardiac arrest/100% of the time/able to follow multistep instructions/able to follow single-step instructions

## 2024-09-03 NOTE — OCCUPATIONAL THERAPY INITIAL EVALUATION ADULT - BED MOBILITY/TRANSFERS, PREVIOUS LEVEL OF FUNCTION, OT EVAL
recently utilizes SC as needed since d/c from [facility] for cardiac arrest, independent without AD prior/independent

## 2024-09-03 NOTE — OCCUPATIONAL THERAPY INITIAL EVALUATION ADULT - GENERAL OBSERVATIONS, REHAB EVAL
Pt received seated at edge of bed with + vEEG< + tele, +BP cuff, +pulse oxi, mother at bedside, agreeable to OT evaluation, left seated edge of bed safely MD and mother present

## 2024-09-03 NOTE — PROGRESS NOTE ADULT - SUBJECTIVE AND OBJECTIVE BOX
Chief complaint: Patient is a 24y old  Male who presents with a chief complaint of Syncope (02 Sep 2024 16:10)    Interval history: Patient seen and examined at bedside this am.  Patient accompanied by mother at bedside. NAD.  Patient denies any acute overnight events or complaints.      Review of systems: A complete 10-point review of systems was obtained and is negative except as stated in the interval history.    Vitals:  T(F): 97.5, Max: 97.5 (09-02 @ 16:52)  HR: 55 (52 - 69)  BP: 109/71 (109/69 - 128/77)  RR: 18 (18 - 18)  SpO2: 93% (93% - 98%)    Ins & outs:     08-31 @ 07:01  -  09-01 @ 07:00  --------------------------------------------------------  IN: 440 mL / OUT: 2380 mL / NET: -1940 mL    09-01 @ 07:01  -  09-02 @ 07:00  --------------------------------------------------------  IN: 0 mL / OUT: 625 mL / NET: -625 mL    09-02 @ 07:01  -  09-03 @ 07:00  --------------------------------------------------------  IN: 0 mL / OUT: 1200 mL / NET: -1200 mL      Weight trend:  Weight (kg): 65.3 (08-30)    Physical exam:  General: No apparent distress  HEENT: Anicteric sclera. Moist mucous membranes. JVP -.   Cardiac: Regular rate and rhythm. No murmurs, rubs, or gallops.   Vascular: Symmetric radial pulses. Dorsalis pedis pulses palpable.   Respiratory: Normal effort. Clear to auscultation.   Abdomen: Soft, nontender. Audible bowel sounds.   Extremities: Warm with no edema. No cyanosis or clubbing.   Skin: Warm and dry. No rash.   Neurologic: Grossly normal motor function.   Psychiatric: Oriented to person, place, and time.     Data reviewed:  - Telemetry: SR/gene 44-84bpm no events  - ECG (8/30/24):   Diagnosis Line Sinus bradycardia  Voltage criteria for left ventricular hypertrophy  ST elevation, consider early repolarization  Abnormal ECG    - TTE:   < from: TTE Echo Complete w/o Contrast w/ Doppler (08.30.24 @ 08:27) >  PHYSICIAN INTERPRETATION:  Left Ventricle: The left ventricular internal cavity size is normal. Left   ventricular wall thickness is normal. Global LV systolic function was   normal. Spectral Doppler shows normal pattern of LV diastolic filling.   Normal LV filling pressures.  Right Ventricle: The right ventricular size is mildly enlarged. RV   systolic function is normal.  Left Atrium: Normal left atrial size.  Right Atrium: Normal right atrial size.  Pericardium: Trivial pericardial effusion is present.  Mitral Valve: Structurally normal mitral valve, with normal leaflet   excursion. No evidence of mitral stenosis. Trace mitral valve   regurgitation is seen.  Tricuspid Valve: Structurally normal tricuspid valve, with normal leaflet   excursion. Trivial tricuspidregurgitation is visualized.  Aortic Valve: Normal trileaflet aortic valve with normal opening. No   evidence of aortic stenosis. No evidence of aortic valve regurgitation is   seen.  Pulmonic Valve: Structurally normal pulmonic valve, with normal leaflet   excursion. Trace pulmonic valve regurgitation.  Aorta: The aortic root and ascending aorta are structurally normal, with   no evidence of dilitation.  Pulmonary Artery: Normal pulmonary artery pressure.  Venous: The inferior vena cava was normal sized, with respiratory size   variation greater than 50%.  In comparison to the previous echocardiogram(s): There are no prior   studies on this patient for comparison purposes.      - Chest x-ray (8/29/24):   Impression:  No radiographic evidence of acute cardiopulmonary disease.      - Labs:                        13.9   6.76  )-----------( 231      ( 03 Sep 2024 05:14 )             42.3     09-03    137  |  99  |  15  ----------------------------<  85  4.3   |  24  |  0.9    Ca    9.9      03 Sep 2024 05:14  Mg     1.9     09-03                  Urinalysis Basic - ( 03 Sep 2024 05:14 )    Color: x / Appearance: x / SG: x / pH: x  Gluc: 85 mg/dL / Ketone: x  / Bili: x / Urobili: x   Blood: x / Protein: x / Nitrite: x   Leuk Esterase: x / RBC: x / WBC x   Sq Epi: x / Non Sq Epi: x / Bacteria: x        Medications:  aspirin enteric coated 81 milliGRAM(s) Oral daily  chlorhexidine 2% Cloths 1 Application(s) Topical <User Schedule>  heparin   Injectable 5000 Unit(s) SubCutaneous every 12 hours    Drips:    PRN:     Allergies    No Known Allergies    Intolerances

## 2024-09-03 NOTE — OCCUPATIONAL THERAPY INITIAL EVALUATION ADULT - PERTINENT HX OF CURRENT PROBLEM, REHAB EVAL
24 year old male with FHx of father who  in his sleep at age of 35 (no autopsy or genetic testing), and PMHx of recent cardiac arrest requiring ~30 mins of chest compressions and a prolonged hospital stay at [facility] who presents with syncope. 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 Pt  had another EP study reportedly as per mother. Admitted to cardiac telemetry for further work up.     Problems discussed and associated plan:  #Syncope, less likely seizure, concern for cardiogenic Syncope

## 2024-09-03 NOTE — PROGRESS NOTE ADULT - SUBJECTIVE AND OBJECTIVE BOX
INTERVAL HPI/OVERNIGHT EVENTS:  No acute events overnight  Patient on VEEG today  HD stable    MEDICATIONS  (STANDING):  aspirin enteric coated 81 milliGRAM(s) Oral daily  chlorhexidine 2% Cloths 1 Application(s) Topical <User Schedule>  heparin   Injectable 5000 Unit(s) SubCutaneous every 12 hours    MEDICATIONS  (PRN):  acetaminophen     Tablet .. 650 milliGRAM(s) Oral every 6 hours PRN Moderate Pain (4 - 6)      Allergies    No Known Allergies    Intolerances        REVIEW OF SYSTEMS: No CP, palpitations, dizziness or SOB     Vital Signs Last 24 Hrs  T(C): 36.4 (02 Sep 2024 16:52), Max: 36.4 (02 Sep 2024 16:52)  T(F): 97.5 (02 Sep 2024 16:52), Max: 97.5 (02 Sep 2024 16:52)  HR: 55 (03 Sep 2024 07:52) (52 - 69)  BP: 109/71 (03 Sep 2024 07:52) (109/69 - 128/77)  BP(mean): 84 (03 Sep 2024 07:52) (84 - 97)  RR: 18 (03 Sep 2024 05:07) (18 - 18)  SpO2: 93% (03 Sep 2024 05:07) (93% - 98%)    Parameters below as of 03 Sep 2024 05:07  Patient On (Oxygen Delivery Method): room air        09-02-24 @ 07:01  -  09-03-24 @ 07:00  --------------------------------------------------------  IN: 0 mL / OUT: 1200 mL / NET: -1200 mL    Physical Exam  GENERAL: In no apparent distress, well nourished, and hydrated.  EYES: EOMI, PERRLA, conjunctiva and sclera clear  NECK: Supple  HEART: Regular rate and rhythm; No murmurs, rubs, or gallops.  PULMONARY: Clear to auscultation and perfusion.  No rales, wheezing, or rhonchi bilaterally.  EXTREMITIES:  2+ Peripheral Pulses, No clubbing, cyanosis, or edema  NEUROLOGICAL: Grossly nonfocal     LABS:                        13.9   6.76  )-----------( 231      ( 03 Sep 2024 05:14 )             42.3     09-03    137  |  99  |  15  ----------------------------<  85  4.3   |  24  |  0.9    Ca    9.9      03 Sep 2024 05:14  Mg     1.9     09-03        Urinalysis Basic - ( 03 Sep 2024 05:14 )    Color: x / Appearance: x / SG: x / pH: x  Gluc: 85 mg/dL / Ketone: x  / Bili: x / Urobili: x   Blood: x / Protein: x / Nitrite: x   Leuk Esterase: x / RBC: x / WBC x   Sq Epi: x / Non Sq Epi: x / Bacteria: x        RADIOLOGY & ADDITIONAL TESTS:

## 2024-09-03 NOTE — PROGRESS NOTE ADULT - ASSESSMENT
Assessment:  24 year old male with FHx of father who  in his sleep at age of 33 (no autopsy or genetic testing), and PMHx of recent cardiac arrest requiring ~30 mins of chest compressions and a prolonged hospital stay at [facility] who presents with syncope. As per records, pt had a clean cath at [facility], had EP study at [place] showed WPW was ablated, and had a cardiac MRI also at the time. Pt  had another EP study reportedly as per mother. Admitted to cardiac telemetry for further work up.     Problems discussed and associated plan:  #Syncope, less likely seizure, concern for cardiogenic Syncope   - h/o cardiac arrest   - ECG 3024: Sinus bradycardia with LVH, high voltage QRS in the lateral precordial leads, normal QTc  - TTE 24: LVEF 62%, normal LV size and wall thickness, RV is mildly enlarged, RVH, no valvular abnormalities, no wall motion abnormalities   - On ASA 81 mg daily, unclear reason but will continue until OSH obtained  - Continue to hold off BB due to bradycardia   - NPO for MN for cardiac MRI to assess for infiltrative disease   - FU TSH and FT4   - EEG on  negative   - Pending video EEG   - EP and neuro are following   - Still pending cardiac MRI and EPS results from [facility]'s medical records.   - Plan for EPS on     #Muscle spasms  - Discontinue gabapentin and amantadine, which was prescribed for post-arrest muscle spasms while at [facility]  - resolved     #DVT ppx  - OOB daily   - Heparin SQ    # Hx of candida auris   - spoke with ID, will keep on contact precautions     Please contact me with any questions or concerns at [phone].

## 2024-09-03 NOTE — PROGRESS NOTE ADULT - ASSESSMENT
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 Early repolarization present on current EKG    Impression:  syncope  arrhythmia, ?WPW, s/p ablation  h/o VF arrest  FHx SCD    Plan:  - Cont VEEG  - Exercise stress test today  - NPO after midnight for EP study tomorrow  - Cont tele montoring  - Monitor electrolytes, maintain WNL  - Obtain records from [place], if no cardiac MRI patient will need  - Will follow 23yo with family h/o SCD, h/o VFib arrest, EP study and ablation, admitted with syncopal episode  Old EKG 4/2023 suggest presence of WPW, not present on this admission EKG. Early repolarization present on current EKG    Impression:  syncope  arrhythmia, ?WPW, s/p ablation  h/o VF arrest  FHx SCD    Plan:  - Cont VEEG  - Exercise stress test today  - NPO after midnight for EP study tomorrow  - Cont tele monitoring  - Monitor electrolytes, maintain WNL  - Obtain records from [place], if no cardiac MRI patient will need  - Will follow

## 2024-09-04 LAB
APTT BLD: 36.2 SEC — SIGNIFICANT CHANGE UP (ref 27–39.2)
INR BLD: 1.06 RATIO — SIGNIFICANT CHANGE UP (ref 0.65–1.3)
PROTHROM AB SERPL-ACNC: 12.1 SEC — SIGNIFICANT CHANGE UP (ref 9.95–12.87)

## 2024-09-04 PROCEDURE — 99232 SBSQ HOSP IP/OBS MODERATE 35: CPT

## 2024-09-04 PROCEDURE — 99233 SBSQ HOSP IP/OBS HIGH 50: CPT | Mod: 57,25

## 2024-09-04 PROCEDURE — 33249 INSJ/RPLCMT DEFIB W/LEAD(S): CPT

## 2024-09-04 PROCEDURE — 93623 PRGRMD STIMJ&PACG IV RX NFS: CPT | Mod: 26

## 2024-09-04 PROCEDURE — 93621 COMP EP EVL L PAC&REC C SINS: CPT | Mod: 26

## 2024-09-04 PROCEDURE — 71045 X-RAY EXAM CHEST 1 VIEW: CPT | Mod: 26

## 2024-09-04 PROCEDURE — 93620 COMP EP EVL R AT VEN PAC&REC: CPT | Mod: 26

## 2024-09-04 RX ORDER — VANCOMYCIN/0.9 % SOD CHLORIDE 1.75G/25
1000 PLASTIC BAG, INJECTION (ML) INTRAVENOUS EVERY 12 HOURS
Refills: 0 | Status: COMPLETED | OUTPATIENT
Start: 2024-09-04 | End: 2024-09-05

## 2024-09-04 RX ORDER — VANCOMYCIN/0.9 % SOD CHLORIDE 1.75G/25
1000 PLASTIC BAG, INJECTION (ML) INTRAVENOUS ONCE
Refills: 0 | Status: COMPLETED | OUTPATIENT
Start: 2024-09-04 | End: 2024-09-04

## 2024-09-04 RX ORDER — TRAMADOL HYDROCHLORIDE 200 MG/1
50 TABLET, EXTENDED RELEASE ORAL ONCE
Refills: 0 | Status: DISCONTINUED | OUTPATIENT
Start: 2024-09-04 | End: 2024-09-04

## 2024-09-04 RX ADMIN — Medication 250 MILLIGRAM(S): at 18:44

## 2024-09-04 RX ADMIN — Medication 250 MILLIGRAM(S): at 16:52

## 2024-09-04 RX ADMIN — CHLORHEXIDINE GLUCONATE 1 APPLICATION(S): 40 SOLUTION TOPICAL at 06:56

## 2024-09-04 RX ADMIN — ACETAMINOPHEN 650 MILLIGRAM(S): 325 TABLET ORAL at 21:19

## 2024-09-04 RX ADMIN — TRAMADOL HYDROCHLORIDE 50 MILLIGRAM(S): 200 TABLET, EXTENDED RELEASE ORAL at 22:04

## 2024-09-04 RX ADMIN — Medication 81 MILLIGRAM(S): at 11:48

## 2024-09-04 RX ADMIN — ACETAMINOPHEN 650 MILLIGRAM(S): 325 TABLET ORAL at 22:08

## 2024-09-04 NOTE — PROGRESS NOTE ADULT - SUBJECTIVE AND OBJECTIVE BOX
HPI:   24-year-old male past medical history of Cardiac arrest in June 2024 prompting around a month hospital stay  at [facility] s/p being on ventillar discharged recently from LTAC in [place], presents to the ED for evaluation of syncope.    Patient accompanied by mother. Patient was at an [facility] center when he was walking around and wanted to sit somewhere, found a chair,  and asked for water. As he sat down, his mother reports he had lost consciousness and was falling forward off his chair, and caught him from falling. LOC around  few seconds, and as per mother patient appeared clammy, diaphoretic, pale and upon waking up patient was at baseline, but did c/o blurry vision. Denies any prodromal or post ictal symptoms, but as per mother she saw some shaking. Denies any tongue biting, urinary incontinence.   As per mother, patient had around a month hospital stay at [facility] post cardiac arrest, where he was told his plumbing of heart is normal , but had problems with wiring. Also reports having an EEG done showed no seizures. Patient since discharge has been following  with cardiologist Dr. Becker in [place], and since then he reports having an abnormal EP study, and was recommended a life vest but due to insurance issues he was not able to get it. Subsequently, he underwent another EP study which as per mother was "normal".   Patient during admission seen in the ED, at baseline mental status,  denies any chest pain, SOB, fever, chills, cough, sickness, n/v/d, head ache, pain, leg swelling/pain, hx of seizures.   BP on ED arrival  91/57 mm, and he received 1 L NS in the ED. prior to admission. Patient further admitted to cardiology for further management. BP on admission 105/70.       Updated EP HPI:  -no symptoms today  -No arrhythmias on Tely  -CMR shows mitral annular disjunction, Mitral valve prolapse and epicardial LGE      MEDICATIONS  (STANDING):  aspirin enteric coated 81 milliGRAM(s) Oral daily  chlorhexidine 2% Cloths 1 Application(s) Topical <User Schedule>  vancomycin  IVPB 1000 milliGRAM(s) IV Intermittent every 12 hours    MEDICATIONS  (PRN):  acetaminophen     Tablet .. 650 milliGRAM(s) Oral every 6 hours PRN Moderate Pain (4 - 6)      Allergies    No Known Allergies    Intolerances        Vital Signs Last 24 Hrs  T(C): 37 (04 Sep 2024 14:10), Max: 37 (04 Sep 2024 14:10)  T(F): 98.6 (04 Sep 2024 14:10), Max: 98.6 (04 Sep 2024 14:10)  HR: 64 (04 Sep 2024 14:10) (55 - 64)  BP: 118/79 (04 Sep 2024 14:10) (105/69 - 118/79)  BP(mean): 81 (04 Sep 2024 13:23) (81 - 85)  RR: 18 (04 Sep 2024 14:10) (18 - 19)  SpO2: 99% (04 Sep 2024 14:10) (93% - 99%)          Physical exam:  General: Awake, alert and oriented.  Normal mood and affect.  No acute distress noted.    Psych: Oriented Mood and affect appropriate. CN intact  Head: No icterus or palor. NO Xanthalasma or jaundice  Neck: No JVD.  No carotid bruit. No palpable thyroid  Cardiology: Heart regular rate and rhythm.   S1 S2, no murmurs, clicks or rubs.    Respiratory: Respirations even and unlabored.  Lungs clear to auscultation bilaterally.  No use of accessory muscles.    Abdomen: Abdomen soft, non-distended.  Bowel sounds positive in 4 quadrants.  No abdominal aneurysm felt  Extremities: No edema noted to lower extremities.  No clubbing or cyanosis.        LABS:                        13.9   6.76  )-----------( 231      ( 03 Sep 2024 05:14 )             42.3     09-03    137  |  99  |  15  ----------------------------<  85  4.3   |  24  |  0.9    Ca    9.9      03 Sep 2024 05:14  Mg     1.9     09-03      PT/INR - ( 04 Sep 2024 05:53 )   PT: 12.10 sec;   INR: 1.06 ratio         PTT - ( 04 Sep 2024 05:53 )  PTT:36.2 sec  Urinalysis Basic - ( 03 Sep 2024 05:14 )    Color: x / Appearance: x / SG: x / pH: x  Gluc: 85 mg/dL / Ketone: x  / Bili: x / Urobili: x   Blood: x / Protein: x / Nitrite: x   Leuk Esterase: x / RBC: x / WBC x   Sq Epi: x / Non Sq Epi: x / Bacteria: x        RADIOLOGY & ADDITIONAL TESTS:    CMR  1. Normal left ventricular size. Normal left ventricular wall thickness.   Normal left ventricular systolic function (LVEF 58%). No regional wall   motion abnormalities.  2. Myocardial scar involving the epicardial aspect of the basal-mid   inferoseptum. This is consistent with an unspecified, non-ischemic   cardiomyopathy.  3. Mildly dilated right ventricle (relative to left ventricular size).   Normal right ventricular systolic function (RVEF 46%).  4. Normal left atrial size. Mild mitral valve prolapse (bi-leaflet   involvement). Trace mitral regurgitation. Mitral annular disjunction   (maximal diameter 4 mm as measured in the three chamber orientation).  5. Small, predominantly apical pericardial effusion.

## 2024-09-04 NOTE — PROGRESS NOTE ADULT - ASSESSMENT
Complex patient    Mitral Annular disjunction (MAD)  Mitral Valve Prolapse  Epicardial LGE: Myocardial scar involving the epicardial aspect of the basal-mid inferoseptum. This is consistent with an unspecified, non-ischemic cardiomyopathy.   Mildly dilated EV  Survivor of sudden cardiac arrest  Syncope at this admission  WPW ablation 8/7/2024 Dr. Becker    I discussed care with Dr. Becker at [facility] over phone on 9/3/2024. I discussed care today with Dr. Draper and Dr. Gonzalez and reviewed CMR with Dr. Gonzalez, I also discussed plan of care with EP team Dr. Medina and Dr. Zepeda.    Etiology of cardiac arrest could be related either to WPW or to MAD. WPW was successfully ablated on 8/7/2024. I recommend EP study today to assess AV conduction and rule out bypass tract. I will also perform v-stim (not done at [facility]) to assess for inducible VT/VF.    Since etiology of cardiac arrest could be related to MAD and MVP, I recommend ICD implant for secondary prevention of sudden cardiac death. My recommendation is based on consensus document published in [employer] (2022) 24, 1981–2003, and accessible at   https://doi.org/10.1093/europace/blsd089    "EHRA expert consensus statement on arrhythmic mitral valve prolapse and mitral annular disjunction complex in collaboration with the ESC [place] on valvular heart disease and the  Association of Cardiovascular Imaging endorsed cby the Heart Rhythm Society, by the [place]ali [employer] Heart Rhythm Society, and by the  Heart Rhythm Society"    On followup, I will refer patient for genetic testing for cardiomyopathy and for PET scan.     A thorough discussion was held with the patient concerning all aspects of ICD therapy. We reviewed the data supporting ICD therapy and how it applies individually. We discussed the risks, nature of procedure, and follow up care after device is implanted, including outcomes of ICD implantation and living with an ICD. We discussed management of ICD therapy throughout life, including deactivation of the ICD. Patient is in agreement with proceeding with the device implant. We discussed the risks of bleeding, hematoma, injury to vessels and heart, perforation, tamponade, pneumothorax, infection, lead dislodgment, device malfunction, and rare risks of stroke/heart attack/death. Patient expressed understanding of the discussion. After all questions were answered, it was a shared decision to proceed with ICD therapy.

## 2024-09-04 NOTE — PROGRESS NOTE ADULT - SUBJECTIVE AND OBJECTIVE BOX
Chief complaint: Patient is a 24y old  Male who presents with a chief complaint of Syncope (02 Sep 2024 16:10)    Interval history: Patient seen and examined at bedside this am.  Patient denies any acute overnight events or complaints.      Review of systems: A complete 10-point review of systems was obtained and is negative except as stated in the interval history.    Vitals:  ICU Vital Signs Last 24 Hrs  T(C): 36.7 (04 Sep 2024 07:29), Max: 36.7 (04 Sep 2024 05:53)  T(F): 98.1 (04 Sep 2024 07:29), Max: 98.1 (04 Sep 2024 07:29)  HR: 56 (04 Sep 2024 07:29) (55 - 56)  BP: 105/69 (04 Sep 2024 07:29) (105/69 - 108/70)  BP(mean): 81 (04 Sep 2024 07:29) (81 - 85)  RR: 18 (04 Sep 2024 07:29) (18 - 19)        Ins & outs:     08-31 @ 07:01  -  09-01 @ 07:00  --------------------------------------------------------  IN: 440 mL / OUT: 2380 mL / NET: -1940 mL    09-01 @ 07:01 - 09-02 @ 07:00  --------------------------------------------------------  IN: 0 mL / OUT: 625 mL / NET: -625 mL    09-02 @ 07:01  -  09-03 @ 07:00  --------------------------------------------------------  IN: 0 mL / OUT: 1200 mL / NET: -1200 mL      Weight trend:  Weight (kg): 65.3 (08-30)    Physical exam:  General: No apparent distress  HEENT: Anicteric sclera. Moist mucous membranes. JVP -.   Cardiac: Regular rate and rhythm. No murmurs, rubs, or gallops.   Vascular: Symmetric radial pulses. Dorsalis pedis pulses palpable.   Respiratory: Normal effort. Clear to auscultation.   Abdomen: Soft, nontender. Audible bowel sounds.   Extremities: Warm with no edema. No cyanosis or clubbing.   Skin: Warm and dry. No rash.   Neurologic: Grossly normal motor function.   Psychiatric: Oriented to person, place, and time.     Data reviewed:  - Telemetry: SR/gene 44-84bpm no events  - ECG (8/30/24):   Diagnosis Line Sinus bradycardia  Voltage criteria for left ventricular hypertrophy  ST elevation, consider early repolarization  Abnormal ECG    - TTE:    TTE Echo Complete w/o Contrast w/ Doppler (08.30.24 @ 08:27) >  PHYSICIAN INTERPRETATION:  Left Ventricle: The left ventricular internal cavity size is normal. Left   ventricular wall thickness is normal. Global LV systolic function was   normal. Spectral Doppler shows normal pattern of LV diastolic filling.   Normal LV filling pressures.  Right Ventricle: The right ventricular size is mildly enlarged. RV   systolic function is normal.  Left Atrium: Normal left atrial size.  Right Atrium: Normal right atrial size.  Pericardium: Trivial pericardial effusion is present.  Mitral Valve: Structurally normal mitral valve, with normal leaflet   excursion. No evidence of mitral stenosis. Trace mitral valve   regurgitation is seen.  Tricuspid Valve: Structurally normal tricuspid valve, with normal leaflet   excursion. Trivial tricuspidregurgitation is visualized.  Aortic Valve: Normal trileaflet aortic valve with normal opening. No   evidence of aortic stenosis. No evidence of aortic valve regurgitation is   seen.  Pulmonic Valve: Structurally normal pulmonic valve, with normal leaflet   excursion. Trace pulmonic valve regurgitation.  Aorta: The aortic root and ascending aorta are structurally normal, with   no evidence of dilitation.  Pulmonary Artery: Normal pulmonary artery pressure.  Venous: The inferior vena cava was normal sized, with respiratory size   variation greater than 50%.  In comparison to the previous echocardiogram(s): There are no prior   studies on this patient for comparison purposes.      - Chest x-ray (8/29/24):   Impression:  No radiographic evidence of acute cardiopulmonary disease.        - Labs:                        13.9   6.76  )-----------( 231      ( 03 Sep 2024 05:14 )             42.3     09-03    137  |  99  |  15  ----------------------------<  85  4.3   |  24  |  0.9    Ca    9.9      03 Sep 2024 05:14  Mg     1.9     09-03        PT/INR - ( 04 Sep 2024 05:53 )   PT: 12.10 sec;   INR: 1.06 ratio         PTT - ( 04 Sep 2024 05:53 )  PTT:36.2 sec          Lactate Trend    Urinalysis Basic - ( 03 Sep 2024 05:14 )    Color: x / Appearance: x / SG: x / pH: x  Gluc: 85 mg/dL / Ketone: x  / Bili: x / Urobili: x   Blood: x / Protein: x / Nitrite: x   Leuk Esterase: x / RBC: x / WBC x   Sq Epi: x / Non Sq Epi: x / Bacteria: x          Medications:  MEDICATIONS  (STANDING):  aspirin enteric coated 81 milliGRAM(s) Oral daily  chlorhexidine 2% Cloths 1 Application(s) Topical <User Schedule>    MEDICATIONS  (PRN):  acetaminophen     Tablet .. 650 milliGRAM(s) Oral every 6 hours PRN Moderate Pain (4 - 6)      Allergies    No Known Allergies    Intolerances

## 2024-09-04 NOTE — CHART NOTE - NSCHARTNOTEFT_GEN_A_CORE
No epileptiform activity on vEEG. Low suspicion for seizures at this time, syncopal etiology likely cardiogenic in nature. No further neurological workup needed at this time. Neurology signing off. Recall as needed.     Discussed with attending Dr. Perez
Electrophysiology Brief Post-Op Note      I have personally seen and examined the patient.  I agree with the history and physical which I have reviewed and noted any changes below.      PRE-OP DIAGNOSIS: Mitral Annular disjunction, MVP, s/p WPW ablation, survivor of sudden cardiac death  POST-OP DIAGNOSIS: Mitral Annular disjunction, MVP, s/p WPW ablation, survivor of sudden cardiac death    PROCEDURE:  -Electrophysiology study with induction of arrhythmias  -Coronary sinus recording/pacing  -Infusion of medicine  -Defibrillator implant    DEVICE COMPANY:  -[employer]    Vascular Access used (using Ultrasound Guidance and micropuncture needle)  -Right Femoral Vein: 5F 5F 7F  -Left Femoral Vein: none  -Right Femoral Artery: none    All sheaths and wires removed and manual pressure applied.    Physician: Carine  Assistant: None    ANESTHESIA TYPE:  [   ]General Anesthesia  [X] Sedation  [x] Local/Regional    CONDITION  [  ] Critical  [  ] Serious  [  ]Fair  [X]Good    SPECIMENS REMOVED (IF APPLICABLE): NONE  All wires were removed      IMPLANTS (IF APPLICABLE)  Single Chamber Defibrillator Implant    FINDINGS (see below)  -Successful Defibrillator implant  -No inducible SVT or VT or other arrhythmias  -No evidence of bypass tract  -No immediate complications  -ESTIMATED BLOOD LOSS:  20 mL  -Contrast used: none    PLAN OF CARE  - Bed rest for 4 hours  - Vancomyocin 1g IV q12 h for 2 doses  - Chest X-ray portable now  - Chest X-ray PA/Lat tomorrow at 6am  - Device interrogation tomorrow in am  - Discontinue Heparin, Lovenox, and NOACS for 48 hours  - No anticoagulation unless discussed with EP attending      FOLLOW-UP  -Outpatient follow-up with Electrophysiology in 3-4 weeks     [address] [place] [address] ([phone])[facility] [phone]
Electrophysiology PA Note    records reviewed  please obtained cardiac MRI report and full report of EP Study /ablation and repeat EP study
Neurology addendum:    Findings:  Background:  continuous.   voltage:  Normal (20uV)  Organization:  Appropriate anterior-posterior gradient  Posterior Dominant Rhythm:  9 Hz symmetric, well-organized, and well-modulated  Variability:  Yes	  Reactivity:  Yes  Sleep:  Symmetric, synchronous spindles and K complexes.  Focal abnormalities:  No persistent asymmetries of voltage or frequency.  Interictal Activity:  None  Location:    Focal Slowing:  None  Generalized Slowing:  No  Events:  1)	No electrographic seizures or significant clinical events.  Provocations:  1)	Hyperventilation: was not performed.  2)	Photic stimulation: was not performed.  Impression:  normal    Clinical Correlation:  Normal study does not exclude diagnosis of seizure disorder        Symptoms more likely cardiogenic call back if needed [phone]
PACU ANESTHESIA ADMISSION NOTE      Procedure: EP study and ICD placement  Post op diagnosis:      ____  Intubated  TV:______       Rate: ______      FiO2: ______    _x___  Patent Airway    __x__  Full return of protective reflexes    __x__  Full recovery from anesthesia / back to baseline     Vitals:   T: 97          R:  14                BP:   104/60               Sat:    97%               P: 94      Mental Status:  __x__ Awake   __x___ Alert   _____ Drowsy   _____ Sedated    Nausea/Vomiting:  ___x_ NO  ______Yes,   See Post - Op Orders          Pain Scale (0-10):  __0___    Treatment: ____ None    _x___ See Post - Op/PCA Orders    Post - Operative Fluids:   ____ Oral   ____ See Post - Op Orders    Plan: Discharge:   ____Home       __x___Floor     _____Critical Care    _____  Other:_________________    Comments: patient hemodynamically stable. Handoff endorsed to PACU RN. No anesthesia related issues present. To be transferred back to floor when criteria met

## 2024-09-04 NOTE — CHART NOTE - NSCHARTNOTESELECT_GEN_ALL_CORE
Brief Operative EP report
Electrophysiology PA Note
Neurology addendum
PACU Note/Transfer Note
Event Note

## 2024-09-04 NOTE — PROGRESS NOTE ADULT - ASSESSMENT
Assessment:  24 year old male with FHx of father who  in his sleep at age of 33 (no autopsy or genetic testing), and PMHx of recent cardiac arrest requiring ~30 mins of chest compressions and a prolonged hospital stay at [facility] who presents with syncope. As per records, pt had a clean cath at [facility], had EP study at [facility] showed WPW was ablated, and had a cardiac MRI also at the time. Pt  had another EP study reportedly as per mother. Admitted to cardiac telemetry for further work up.     Problems discussed and associated plan:  #Syncope, less likely seizure, concern for cardiogenic Syncope   - h/o cardiac arrest   - ECG 3024: Sinus bradycardia with LVH, high voltage QRS in the lateral precordial leads, normal QTc  - TTE 24: LVEF 62%, normal LV size and wall thickness, RV is mildly enlarged, RVH, no valvular abnormalities, no wall motion abnormalities   - On ASA 81 mg daily, unclear reason but will continue until OSH obtained  - Continue to hold off BB due to bradycardia   - MRI read pending  - EEG on  negative   - video EEG done  - EP and neuro are following   - Still pending cardiac MRI and EPS results from [facility]'s medical records.   - Plan for EPS today    #Muscle spasms  - Discontinue gabapentin and amantadine, which was prescribed for post-arrest muscle spasms while at [facility]  - resolved     #DVT ppx  - OOB daily   - Heparin SQ    # Hx of candida auris   - spoke with ID, will keep on contact precautions     Please contact me with any questions or concerns at [phone].

## 2024-09-05 ENCOUNTER — TRANSCRIPTION ENCOUNTER (OUTPATIENT)
Age: 24
End: 2024-09-05

## 2024-09-05 LAB
ANION GAP SERPL CALC-SCNC: 11 MMOL/L — SIGNIFICANT CHANGE UP (ref 7–14)
BUN SERPL-MCNC: 18 MG/DL — SIGNIFICANT CHANGE UP (ref 10–20)
CALCIUM SERPL-MCNC: 9.5 MG/DL — SIGNIFICANT CHANGE UP (ref 8.4–10.5)
CHLORIDE SERPL-SCNC: 101 MMOL/L — SIGNIFICANT CHANGE UP (ref 98–110)
CO2 SERPL-SCNC: 25 MMOL/L — SIGNIFICANT CHANGE UP (ref 17–32)
CREAT SERPL-MCNC: 0.9 MG/DL — SIGNIFICANT CHANGE UP (ref 0.7–1.5)
EGFR: 124 ML/MIN/1.73M2 — SIGNIFICANT CHANGE UP
GLUCOSE SERPL-MCNC: 110 MG/DL — HIGH (ref 70–99)
HCT VFR BLD CALC: 39.7 % — LOW (ref 42–52)
HGB BLD-MCNC: 13 G/DL — LOW (ref 14–18)
MAGNESIUM SERPL-MCNC: 1.7 MG/DL — LOW (ref 1.8–2.4)
MCHC RBC-ENTMCNC: 30.2 PG — SIGNIFICANT CHANGE UP (ref 27–31)
MCHC RBC-ENTMCNC: 32.7 G/DL — SIGNIFICANT CHANGE UP (ref 32–37)
MCV RBC AUTO: 92.1 FL — SIGNIFICANT CHANGE UP (ref 80–94)
NRBC # BLD: 0 /100 WBCS — SIGNIFICANT CHANGE UP (ref 0–0)
PLATELET # BLD AUTO: 227 K/UL — SIGNIFICANT CHANGE UP (ref 130–400)
PMV BLD: 9.2 FL — SIGNIFICANT CHANGE UP (ref 7.4–10.4)
POTASSIUM SERPL-MCNC: 3.9 MMOL/L — SIGNIFICANT CHANGE UP (ref 3.5–5)
POTASSIUM SERPL-SCNC: 3.9 MMOL/L — SIGNIFICANT CHANGE UP (ref 3.5–5)
RBC # BLD: 4.31 M/UL — LOW (ref 4.7–6.1)
RBC # FLD: 12.6 % — SIGNIFICANT CHANGE UP (ref 11.5–14.5)
SODIUM SERPL-SCNC: 137 MMOL/L — SIGNIFICANT CHANGE UP (ref 135–146)
WBC # BLD: 8.95 K/UL — SIGNIFICANT CHANGE UP (ref 4.8–10.8)
WBC # FLD AUTO: 8.95 K/UL — SIGNIFICANT CHANGE UP (ref 4.8–10.8)

## 2024-09-05 PROCEDURE — 93287 PERI-PX DEVICE EVAL & PRGR: CPT | Mod: 26

## 2024-09-05 PROCEDURE — 99232 SBSQ HOSP IP/OBS MODERATE 35: CPT

## 2024-09-05 PROCEDURE — 71046 X-RAY EXAM CHEST 2 VIEWS: CPT | Mod: 26

## 2024-09-05 PROCEDURE — 93010 ELECTROCARDIOGRAM REPORT: CPT

## 2024-09-05 PROCEDURE — 99024 POSTOP FOLLOW-UP VISIT: CPT

## 2024-09-05 RX ORDER — ASPIRIN 81 MG
1 TABLET, DELAYED RELEASE (ENTERIC COATED) ORAL
Refills: 0 | DISCHARGE

## 2024-09-05 RX ORDER — AMANTADINE HCL 100 MG
1 CAPSULE ORAL
Refills: 0 | DISCHARGE

## 2024-09-05 RX ORDER — METOPROLOL TARTRATE 100 MG/1
1 TABLET ORAL
Refills: 0 | DISCHARGE

## 2024-09-05 RX ORDER — GABAPENTIN 100 MG
1 CAPSULE ORAL
Refills: 0 | DISCHARGE

## 2024-09-05 RX ADMIN — Medication 250 MILLIGRAM(S): at 05:40

## 2024-09-05 RX ADMIN — ACETAMINOPHEN 650 MILLIGRAM(S): 325 TABLET ORAL at 09:14

## 2024-09-05 RX ADMIN — TRAMADOL HYDROCHLORIDE 50 MILLIGRAM(S): 200 TABLET, EXTENDED RELEASE ORAL at 00:02

## 2024-09-05 RX ADMIN — ACETAMINOPHEN 650 MILLIGRAM(S): 325 TABLET ORAL at 18:49

## 2024-09-05 RX ADMIN — Medication 81 MILLIGRAM(S): at 11:15

## 2024-09-05 RX ADMIN — CHLORHEXIDINE GLUCONATE 1 APPLICATION(S): 40 SOLUTION TOPICAL at 05:47

## 2024-09-05 RX ADMIN — ACETAMINOPHEN 650 MILLIGRAM(S): 325 TABLET ORAL at 10:10

## 2024-09-05 RX ADMIN — Medication 250 MILLIGRAM(S): at 17:12

## 2024-09-05 RX ADMIN — Medication 25 GRAM(S): at 09:14

## 2024-09-05 NOTE — PROGRESS NOTE ADULT - TIME BILLING
SUSANA, MVP, survivor of SCD
Chart review, bedside evaluation, discussion with patient and family, coordination with consultations, EP and Neurology.
Chart review, bedside evaluation, discussion of plan with patient, coordination of plan with EP
Chart review, bedside evaluation, discussion of plan with patient, coordination of plan with EP
Chart review, bedside evaluation, discussion of plan with patient and mother, coordination of plan with EP
Chart review, bedside evaluation, discussion with patient and mother, coordination of MRI, attempting to get medical records from OSH.
Chart review, bedside evaluation, discussion with patient and family, coordination with EP.
Syncope, WPW

## 2024-09-05 NOTE — PROGRESS NOTE ADULT - NS ATTEND AMEND GEN_ALL_CORE FT
In brief, 24M with family history of sudden death (father  in his sleep at age 33), recent month-long hospitalization at [facility] with VF arrest requiring 30 minutes of ACLS, and recent EPS/ablation for WPW at [facility] presenting with syncope.    Cardiac catheterization at the OSH was normal.  cMRI was reportedly performed but despite multiple attempts by the team to reach [facility], we are still unable to obtain these records.    EP here has been following closely with plans for cMRI here if we cannot obtain results (already scheduled for tonight), EPS tomorrow, and initial plans for exercise stress testing. EST was deferred as patient cannot safely navigate the treadmill at this time.    Plan:  - cMRI tonight  - EPS with EP tomorrow. If negative will recommend ILR. If positive will discuss ICD implant.  - Appreciate neurology consult, EEG has been unremarkable  - Will continue to attempt to obtain EPS reports and cMRI records from [facility].
complex patient  history of WPW ablation on 8/7/2024  Syncope - etiology unclear r/o arrhythmias - r/o seizures  Please obtain video-EEG  Please obtain CMR records and two EP study reports from [facility]  Plan for EP study and possible ILR implant on 9/4/2024
I have evaluated the patient with the fellow/resident/ACP, Yunier, and I am providing specific recommendations regarding the patient's management. My discussion with the team members included review of the patient's history, physical examination, laboratory data and procedural studies. I agree with the documented findings and plan as detailed above, with the exceptions noted.     Agree with above.  NPO after midnight.  Please obtain prior records from [facility] Zepeda MD  Cardiac Electrophysiology
24yoM with FHx of father who  in his sleep at age 33 (no autopsy or genetic testing) and PMHx of cardiac arrest requiring ~30 minutes of chest compressions and month-long hospitalization at [facility] who presents with syncope.     Per the patient's mother, patient's father had no known medical conditions. On the day of his death, he was feeling unwell and going to the bathroom frequently overnight. He called out sick, which he normally does not do. In the morning, she got up and sent her son/patient off to school. When she returned to the bedroom, her /patient's father was blue and cold in bed. Per the patient's mother, she does not know of any cardiac conditions or syncope/arrest in her late 's family.     Mr. Diaz has had 3 syncopal episodes in his life. Once in high school when he came out of a sauna. Another "on his birthday", when he was urinating while standing and his mother found him unconscious on the bathroom floor. And yesterday's episode. Patient was sitting down with a glass of water, when he passed out and spilled the water. His mother caught him falling in his chair, saying he was diaphoretic, clammy, and pale. Episode lasted a few seconds, up to a minute.     As per above, patient with recent history of cardiac arrest and now follows with Dr. Becker in [place]. Reportedly had "normal plumbing," an EP study, and a LifeVest was recommended but not covered by insurance.     On my review of the TTE, there is right ventricular dilitation and hypertrophy, no other significant findings. The ostium of his left main is located at the LCC.    Plan:   - On ASA 81 mg daily, unclear reason but will continue until OSH obtained  - Discontinue gabapentin and amantadine, which was prescribed for post-arrest muscle spasms while at [facility]  - CMR for infiltrative disease and rule out of ARVD  - Sending a Release of Medical Records to [facility], and other institution where patient has been cared for  - Trop: 13 --> 11  - ECG 3024: Sinus bradycardia with LVH, high voltage QRS in the lateral precordial leads, normal QTc  - TTE 24: LVEF 62%, normal LV size and wall thickness, RV is mildly enlarged, RVH, no valvular abnormalities  - Will not order CCTA at this time because LM is visible off LCC, awaiting OSH records  - Consult EP for ILR and LifeVest versus ICD placement.
Briefly, 24yoM with FHx of father who  in his sleep at age 33 (no autopsy or genetic testing) and PMHx of cardiac arrest requiring ~30 minutes of chest compressions and month-long hospitalization at [facility] who presents with syncope.     Records reviewed from OSH. Patient had an ablation for WPW. He had a normal cardiac catheterization. He seems to have had a CARDIAC MRI but we still do not have the report.       Plan:   - On ASA 81 mg daily, unclear reason and not specified on OSH review  - Discontinue gabapentin and amantadine, which was prescribed for post-arrest muscle spasms while at [facility]  - CMR for infiltrative disease and rule out of ARVD unless we can get the report  - EP consult appreciated  - Neurology consult appreciated  - Patient has a history of Candida Auris as per records from OSH so he is on contact precautions
In brief, 24M with family history of sudden death (father  in his sleep at age 33), recent month-long hospitalization at [facility] with VF arrest requiring 30 minutes of ACLS, and recent EPS/ablation for WPW at [facility] presenting with syncope.    Cardiac catheterization at the OSH was normal.  cMRI was reportedly performed but despite multiple attempts by the team to reach [facility], we are still unable to obtain these records.  Per verbal report the cMRI was normal.    EPS 24:  - Conclusions: (1) manifest pre-excitation, (2) pathologic antegrade conduction; (3) inducible SVT, (4) successful ablation of accessory pathway; (5) Few beats, only on isoproterenol, of wider complex tachycardia with rapid burst pacing of uncertain significance.  - Recommendations: (1) Defibrillator vest; (2) follow up in 1 month; (3) consideration of late repeat electrophysiologic study to ensure absence of accessory pathway function    cMRI here:  1. Normal left ventricular size. Normal left ventricular wall thickness. Normal left ventricular systolic function (LVEF 58%). No regional wall   motion abnormalities.  2. Myocardial scar involving the epicardial aspect of the basal-mid inferoseptum. This is consistent with an unspecified, non-ischemic   cardiomyopathy.  3. Mildly dilated right ventricle (relative to left ventricular size). Normal right ventricular systolic function (RVEF 46%).  4. Normal left atrial size. Mild mitral valve prolapse (bi-leaflet involvement). Trace mitral regurgitation. Mitral annular disjunction   (maximal diameter 4 mm as measured in the three chamber orientation).  5. Small, predominantly apical pericardial effusion.    Plan:  - s/p ICD implant for secondary prevention i/s/o cMRI showing mild MVP with mild MAD and epicardial LGE.  - PT evaluation, dispo pending
complex patient  s/p ICD implant for secondary prevention of sudden cardiac death in the setting of Mitral Annular disjunction, mitral valve prolapse, and epicardial LGE  recommend outpatient genetic testing and outpatient PET scan
24yoM with FHx of father who  in his sleep at age 33 (no autopsy or genetic testing) and PMHx of cardiac arrest requiring ~30 minutes of chest compressions and month-long hospitalization at [facility] who presents with syncope.     Records reviewed from OSH. Patient had an ablation for WPW. He had a normal cardiac catheterization. He seems to have had a CARDIAC MRI but we still do not have the report.       Plan:   - On ASA 81 mg daily, unclear reason and not specified on OSH review  - Discontinue gabapentin and amantadine, which was prescribed for post-arrest muscle spasms while at [facility]  - CMR for infiltrative disease and rule out of ARVD unless we can get the report  - EP consult appreciated  - Neurology consult appreciated  - Patient has a history of Candida Auris as per records from OSH so he is on contact precautions.
In brief, 24M with family history of sudden death (father  in his sleep at age 33), recent month-long hospitalization at [facility] with VF arrest requiring 30 minutes of ACLS, and recent EPS/ablation for WPW at [facility] presenting with syncope.    Cardiac catheterization at the OSH was normal.  cMRI was reportedly performed but despite multiple attempts by the team to reach [facility], we are still unable to obtain these records.  Per verbal report the cMRI was normal.    EPS 24:  - Conclusions: (1) manifest pre-excitation, (2) pathologic antegrade conduction; (3) inducible SVT, (4) successful ablation of accessory pathway; (5) Few beats, only on isoproterenol, of wider complex tachycardia with rapid burst pacing of uncertain significance.  - Recommendations: (1) Defibrillator vest; (2) follow up in 1 month; (3) consideration of late repeat electrophysiologic study to ensure absence of accessory pathway function    cMRI here:  1. Normal left ventricular size. Normal left ventricular wall thickness. Normal left ventricular systolic function (LVEF 58%). No regional wall   motion abnormalities.  2. Myocardial scar involving the epicardial aspect of the basal-mid inferoseptum. This is consistent with an unspecified, non-ischemic   cardiomyopathy.  3. Mildly dilated right ventricle (relative to left ventricular size). Normal right ventricular systolic function (RVEF 46%).  4. Normal left atrial size. Mild mitral valve prolapse (bi-leaflet involvement). Trace mitral regurgitation. Mitral annular disjunction   (maximal diameter 4 mm as measured in the three chamber orientation).  5. Small, predominantly apical pericardial effusion.    Plan:  - EPS today with possible ICD pending results.    Further care pending EPS

## 2024-09-05 NOTE — PROGRESS NOTE ADULT - SUBJECTIVE AND OBJECTIVE BOX
INTERVAL HPI/OVERNIGHT EVENTS:  Pt is POD #1 EPS (negative) and ICD implant. No acute events over night. SR 60s. C/o pain at surgical site.   Patient denies fever, chills, dizziness, syncope,  palpitations, SOB, cough, abd pain, n/v/d/c, dysuria, hematuria or unusual rash.     MEDICATIONS  (STANDING):  aspirin enteric coated 81 milliGRAM(s) Oral daily  chlorhexidine 2% Cloths 1 Application(s) Topical <User Schedule>  vancomycin  IVPB 1000 milliGRAM(s) IV Intermittent every 12 hours    MEDICATIONS  (PRN):  acetaminophen     Tablet .. 650 milliGRAM(s) Oral every 6 hours PRN Moderate Pain (4 - 6)      Allergies    No Known Allergies    Intolerances    REVIEW OF SYSTEMS    [x] A ten-point review of systems was otherwise negative except as noted.  [ ] Due to altered mental status/intubation, subjective information were not able to be obtained from the patient. History was obtained, to the extent possible, from review of the chart and collateral sources of information.      Vital Signs Last 24 Hrs  T(C): 36.8 (05 Sep 2024 08:20), Max: 37 (04 Sep 2024 14:10)  T(F): 98.2 (05 Sep 2024 08:20), Max: 98.6 (04 Sep 2024 14:10)  HR: 69 (05 Sep 2024 08:20) (52 - 89)  BP: 109/68 (05 Sep 2024 08:20) (94/60 - 118/79)  BP(mean): 84 (05 Sep 2024 08:20) (80 - 84)  RR: 18 (05 Sep 2024 08:20) (18 - 19)  SpO2: 98% (05 Sep 2024 08:20) (93% - 100%)    Parameters below as of 05 Sep 2024 08:20  Patient On (Oxygen Delivery Method): room air        09-04-24 @ 07:01  -  09-05-24 @ 07:00  --------------------------------------------------------  IN: 0 mL / OUT: 600 mL / NET: -600 mL    09-05-24 @ 07:01  -  09-05-24 @ 10:50  --------------------------------------------------------  IN: 50 mL / OUT: 0 mL / NET: 50 mL        Physical Exam  GENERAL: In no apparent distress, well nourished, and hydrated.  HEART: Regular rate and rhythm; No murmurs, rubs, or gallops.  PULMONARY: Clear to auscultation and perfusion.  No rales, wheezing, or rhonchi bilaterally.  CHEST: left chest wall surgical incision without drainage or hematoma  ABDOMEN: Soft, Nontender, Nondistended; Bowel sounds present  EXTREMITIES:  Rt groin soft, non-tender. No drainage or hematoma noted. 2+ Peripheral Pulses, No clubbing, cyanosis, or edema  NEUROLOGICAL: AO x4 ,WEEKS, speech clear    LABS:                        13.0   8.95  )-----------( 227      ( 05 Sep 2024 06:11 )             39.7     09-05    137  |  101  |  18  ----------------------------<  110<H>  3.9   |  25  |  0.9    Ca    9.5      05 Sep 2024 06:11  Mg     1.7     09-05      PT/INR - ( 04 Sep 2024 05:53 )   PT: 12.10 sec;   INR: 1.06 ratio         PTT - ( 04 Sep 2024 05:53 )  PTT:36.2 sec  Urinalysis Basic - ( 05 Sep 2024 06:11 )    Color: x / Appearance: x / SG: x / pH: x  Gluc: 110 mg/dL / Ketone: x  / Bili: x / Urobili: x   Blood: x / Protein: x / Nitrite: x   Leuk Esterase: x / RBC: x / WBC x   Sq Epi: x / Non Sq Epi: x / Bacteria: x        09-04-24 @ 07:01  -  09-05-24 @ 07:00  --------------------------------------------------------  IN: 0 mL / OUT: 600 mL / NET: -600 mL    09-05-24 @ 07:01  -  09-05-24 @ 10:50  --------------------------------------------------------  IN: 50 mL / OUT: 0 mL / NET: 50 mL        09-04-24 @ 07:01  -  09-05-24 @ 07:00  --------------------------------------------------------  IN: 0 mL / OUT: 600 mL / NET: -600 mL    09-05-24 @ 07:01  -  09-05-24 @ 10:50  --------------------------------------------------------  IN: 50 mL / OUT: 0 mL / NET: 50 mL        RADIOLOGY:  < from: TTE Echo Complete w/o Contrast w/ Doppler (08.30.24 @ 08:27) >  Summary:   1. Normal global left ventricular systolic function.   2. LV Ejection Fraction by Harrison's Method with a biplane EF of 62 %.   3. Mildly enlarged right ventricle. RV systolic function is normal   4. No hemodynamically significant valvular regurgitation or stenosis.   5. Normal pulmonary artery pressure.    < end of copied text >    < from: MR Cardiac w/wo IV Cont (09.03.24 @ 18:16) >  SUMMARY:  1. Normal left ventricular size. Normal left ventricular wall thickness.   Normal left ventricular systolic function (LVEF 58%). No regional wall   motion abnormalities.  2. Myocardial scar involving the epicardial aspect of the basal-mid   inferoseptum. This is consistent with an unspecified, non-ischemic   cardiomyopathy.  3. Mildly dilated right ventricle (relative to left ventricular size).   Normal right ventricular systolic function (RVEF 46%).  4. Normal left atrial size. Mild mitral valve prolapse (bi-leaflet   involvement). Trace mitral regurgitation. Mitral annular disjunction   (maximal diameter 4 mm as measured in the three chamber orientation).  5. Small, predominantly apical pericardial effusion.    < end of copied text >       INTERVAL HPI/OVERNIGHT EVENTS:  Pt is POD #1 EPS (no WPW) and ICD implant. No acute events over night. SR 60s. C/o pain at surgical site.   Patient denies fever, chills, dizziness, syncope,  palpitations, SOB, cough, abd pain, n/v/d/c, dysuria, hematuria or unusual rash.     MEDICATIONS  (STANDING):  aspirin enteric coated 81 milliGRAM(s) Oral daily  chlorhexidine 2% Cloths 1 Application(s) Topical <User Schedule>  vancomycin  IVPB 1000 milliGRAM(s) IV Intermittent every 12 hours    MEDICATIONS  (PRN):  acetaminophen     Tablet .. 650 milliGRAM(s) Oral every 6 hours PRN Moderate Pain (4 - 6)      Allergies    No Known Allergies    Intolerances    REVIEW OF SYSTEMS    [x] A ten-point review of systems was otherwise negative except as noted.  [ ] Due to altered mental status/intubation, subjective information were not able to be obtained from the patient. History was obtained, to the extent possible, from review of the chart and collateral sources of information.      Vital Signs Last 24 Hrs  T(C): 36.8 (05 Sep 2024 08:20), Max: 37 (04 Sep 2024 14:10)  T(F): 98.2 (05 Sep 2024 08:20), Max: 98.6 (04 Sep 2024 14:10)  HR: 69 (05 Sep 2024 08:20) (52 - 89)  BP: 109/68 (05 Sep 2024 08:20) (94/60 - 118/79)  BP(mean): 84 (05 Sep 2024 08:20) (80 - 84)  RR: 18 (05 Sep 2024 08:20) (18 - 19)  SpO2: 98% (05 Sep 2024 08:20) (93% - 100%)    Parameters below as of 05 Sep 2024 08:20  Patient On (Oxygen Delivery Method): room air        09-04-24 @ 07:01  -  09-05-24 @ 07:00  --------------------------------------------------------  IN: 0 mL / OUT: 600 mL / NET: -600 mL    09-05-24 @ 07:01  -  09-05-24 @ 10:50  --------------------------------------------------------  IN: 50 mL / OUT: 0 mL / NET: 50 mL        Physical Exam  GENERAL: In no apparent distress, well nourished, and hydrated.  HEART: Regular rate and rhythm; No murmurs, rubs, or gallops.  PULMONARY: Clear to auscultation and perfusion.  No rales, wheezing, or rhonchi bilaterally.  CHEST: left chest wall surgical incision without drainage or hematoma  ABDOMEN: Soft, Nontender, Nondistended; Bowel sounds present  EXTREMITIES:  Rt groin soft, non-tender. No drainage or hematoma noted. 2+ Peripheral Pulses, No clubbing, cyanosis, or edema  NEUROLOGICAL: AO x4 ,WEEKS, speech clear    LABS:                        13.0   8.95  )-----------( 227      ( 05 Sep 2024 06:11 )             39.7     09-05    137  |  101  |  18  ----------------------------<  110<H>  3.9   |  25  |  0.9    Ca    9.5      05 Sep 2024 06:11  Mg     1.7     09-05      PT/INR - ( 04 Sep 2024 05:53 )   PT: 12.10 sec;   INR: 1.06 ratio         PTT - ( 04 Sep 2024 05:53 )  PTT:36.2 sec  Urinalysis Basic - ( 05 Sep 2024 06:11 )    Color: x / Appearance: x / SG: x / pH: x  Gluc: 110 mg/dL / Ketone: x  / Bili: x / Urobili: x   Blood: x / Protein: x / Nitrite: x   Leuk Esterase: x / RBC: x / WBC x   Sq Epi: x / Non Sq Epi: x / Bacteria: x        09-04-24 @ 07:01  -  09-05-24 @ 07:00  --------------------------------------------------------  IN: 0 mL / OUT: 600 mL / NET: -600 mL    09-05-24 @ 07:01  -  09-05-24 @ 10:50  --------------------------------------------------------  IN: 50 mL / OUT: 0 mL / NET: 50 mL        09-04-24 @ 07:01  -  09-05-24 @ 07:00  --------------------------------------------------------  IN: 0 mL / OUT: 600 mL / NET: -600 mL    09-05-24 @ 07:01  -  09-05-24 @ 10:50  --------------------------------------------------------  IN: 50 mL / OUT: 0 mL / NET: 50 mL        RADIOLOGY:  < from: TTE Echo Complete w/o Contrast w/ Doppler (08.30.24 @ 08:27) >  Summary:   1. Normal global left ventricular systolic function.   2. LV Ejection Fraction by Harrison's Method with a biplane EF of 62 %.   3. Mildly enlarged right ventricle. RV systolic function is normal   4. No hemodynamically significant valvular regurgitation or stenosis.   5. Normal pulmonary artery pressure.    < end of copied text >    < from: MR Cardiac w/wo IV Cont (09.03.24 @ 18:16) >  SUMMARY:  1. Normal left ventricular size. Normal left ventricular wall thickness.   Normal left ventricular systolic function (LVEF 58%). No regional wall   motion abnormalities.  2. Myocardial scar involving the epicardial aspect of the basal-mid   inferoseptum. This is consistent with an unspecified, non-ischemic   cardiomyopathy.  3. Mildly dilated right ventricle (relative to left ventricular size).   Normal right ventricular systolic function (RVEF 46%).  4. Normal left atrial size. Mild mitral valve prolapse (bi-leaflet   involvement). Trace mitral regurgitation. Mitral annular disjunction   (maximal diameter 4 mm as measured in the three chamber orientation).  5. Small, predominantly apical pericardial effusion.    < end of copied text >

## 2024-09-05 NOTE — DISCHARGE NOTE PROVIDER - HOSPITAL COURSE
24 year old male with FHx of father who  in his sleep at age of 33 (no autopsy or genetic testing), and PMHx of recent cardiac arrest requiring ~30 mins of chest compressions and a prolonged hospital stay at [facility] who presents with syncope. As per records, pt had a clean cath at 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 Pt  had another EP study reportedly as per mother. Admitted to cardiac telemetry for further work up. Cardiac catheterization at the OSH was normal. cMRI was reportedly performed but despite multiple attempts by the team to reach [facility], we are still unable to obtain these records. Per verbal report the cMRI was normal. 24 year old male with FHx of father who  in his sleep at age of 33 (no autopsy or genetic testing), and PMHx of recent cardiac arrest requiring ~30 mins of chest compressions and a prolonged hospital stay at [facility] who presents with syncope. As per records, pt had a clean cath at [facility], had EP study at [place] showed WPW was ablated, and had a cardiac MRI also at the time. Pt  had another EP study reportedly as per mother. Admitted to cardiac telemetry for further work up. Cardiac catheterization at the OSH was normal. cMRI was reportedly performed but despite multiple attempts by the team to reach [place], we are still unable to obtain these records. Per verbal report the cMRI was normal.    EPS 24 ([place])  - Conclusions: (1) manifest pre-excitation, (2) pathologic antegrade conduction; (3) inducible SVT, (4) successful ablation of accessory pathway; (5) Few beats, only on isoproterenol, of wider complex tachycardia with rapid burst pacing of uncertain significance.  - Recommendations: (1) Defibrillator vest; (2) follow up in 1 month; (3) consideration of late repeat electrophysiologic study to ensure absence of accessory pathway function    cMRI here (9/3/24)  1. Normal left ventricular size. Normal left ventricular wall thickness. Normal left ventricular systolic function (LVEF 58%). No regional wall   motion abnormalities.  2. Myocardial scar involving the epicardial aspect of the basal-mid inferoseptum. This is consistent with an unspecified, non-ischemic   cardiomyopathy.  3. Mildly dilated right ventricle (relative to left ventricular size). Normal right ventricular systolic function (RVEF 46%).  4. Normal left atrial size. Mild mitral valve prolapse (bi-leaflet involvement). Trace mitral regurgitation. Mitral annular disjunction   (maximal diameter 4 mm as measured in the three chamber orientation).  5. Small, predominantly apical pericardial effusion.    On 24, patient underwent successful DC ICD implantation. Patient was monitored overnight. On POD 1 patient remains HD stable with no complaints. Examination of ICD pocket is C/D/I with no hematoma or erythema. Device interrogation reveals normal device function and CXR was negative for pneumothorax. Patient is being DC home in stable condition. 24 year old male with FHx of father who  in his sleep at age of 33 (no autopsy or genetic testing), and PMHx of recent cardiac arrest requiring ~30 mins of chest compressions and a prolonged hospital stay at [facility] who presents with syncope. As per records, pt had a clean cath at [facility], had EP study at [place] showed WPW was ablated, and had a cardiac MRI also at the time. Pt  had another EP study reportedly as per mother. Admitted to cardiac telemetry for further work up. Cardiac catheterization at the OSH was normal. cMRI was reportedly performed but despite multiple attempts by the team to reach [place], we are still unable to obtain these records. Per verbal report the cMRI was normal.    EPS 24 ([place])  - Conclusions: (1) manifest pre-excitation, (2) pathologic antegrade conduction; (3) inducible SVT, (4) successful ablation of accessory pathway; (5) Few beats, only on isoproterenol, of wider complex tachycardia with rapid burst pacing of uncertain significance.  - Recommendations: (1) Defibrillator vest; (2) follow up in 1 month; (3) consideration of late repeat electrophysiologic study to ensure absence of accessory pathway function    cMRI here (9/3/24)  1. Normal left ventricular size. Normal left ventricular wall thickness. Normal left ventricular systolic function (LVEF 58%). No regional wall   motion abnormalities.  2. Myocardial scar involving the epicardial aspect of the basal-mid inferoseptum. This is consistent with an unspecified, non-ischemic   cardiomyopathy.  3. Mildly dilated right ventricle (relative to left ventricular size). Normal right ventricular systolic function (RVEF 46%).  4. Normal left atrial size. Mild mitral valve prolapse (bi-leaflet involvement). Trace mitral regurgitation. Mitral annular disjunction   (maximal diameter 4 mm as measured in the three chamber orientation).  5. Small, predominantly apical pericardial effusion.    On 24, patient underwent an EP study.  Since etiology of cardiac arrest could be related to MAD and MVP patient underwent successful DC ICD implantation. Patient was monitored overnight. On POD 1 patient remains HD stable with no complaints. Examination of ICD pocket is C/D/I with no hematoma or erythema. Device interrogation reveals normal device function and CXR was negative for pneumothorax. Patient is being DC home in stable condition.

## 2024-09-05 NOTE — PROGRESS NOTE ADULT - ASSESSMENT
EP: Carine    23yo with family h/o SCD, h/o VFib arrest s/p EP study and ablation, admitted with syncopal episode.  Old EKG 4/2023 suggest presence of WPW, not present on this admission EKG. Early repolarization present on current EKG. Patient underwent EPS (negative) and DC ICD ([employer]) on 9/4/2024. No immediate postop complications noted.     TTE: 62%     CMRI: + scar (see report above)         Impression:  S/p DC ICD , EPS (negative)  syncope  arrhythmia, ?WPW  Mitral annular dysjunction (MAD)  Mitral valve prolapse  h/o VF arrest  Hx EPS and WPW ablation 8/7/24  FHx SCD    Plan:  - Interrogation complete: VVI @ 40 bpm, no events. Normal functioning device.   - CXR noted  - HOLD any heparin, lovenox or DOACs for 48 hr following procedure to minimize risk of hematoma  - May resume PO diet  - Cont current care  - Tylenol for pain  - Ambulate  - Will plan for outpatient genetic testing    Discharge Instructions:  - No heavy lifting > 5 lbs. for 4-6 weeks  - Do not raise your arm above shoulder level for 4-6 weeks.   - Do not shower for 5 days, may resume on Monday  - No submerging in water for 1 month  - Leave steri-strips in place, they will fall off on their own  - No driving for 1 week   EP: Carine    25yo with family h/o SCD, h/o VFib arrest s/p EP study and ablation, admitted with syncopal episode.  Old EKG 4/2023 suggest presence of WPW, not present on this admission EKG. Early repolarization present on current EKG. Patient underwent EPS (nno WPW) and DC ICD ([employer]) on 9/4/2024. No immediate postop complications noted.     TTE: 62%     CMRI: + scar (see report above)         Impression:  S/p DC ICD , EPS (negative)  syncope  arrhythmia, ?WPW  Mitral annular dysjunction (MAD)  Mitral valve prolapse  h/o VF arrest  Hx EPS and WPW ablation 8/7/24  FHx SCD    Plan:  - Interrogation complete: VVI @ 40 bpm, no events. Normal functioning device.   - CXR noted  - HOLD any heparin, lovenox or DOACs for 48 hr following procedure to minimize risk of hematoma  - May resume PO diet  - Cont current care  - Tylenol for pain  - Ambulate  - Will plan for outpatient genetic testing    Discharge Instructions:  - No heavy lifting > 5 lbs. for 4-6 weeks  - Do not raise your arm above shoulder level for 4-6 weeks.   - Do not shower for 5 days, may resume on Monday  - No submerging in water for 1 month  - Leave steri-strips in place, they will fall off on their own  - No driving for 1 week

## 2024-09-05 NOTE — DISCHARGE NOTE PROVIDER - NSDCMRMEDTOKEN_GEN_ALL_CORE_FT
amantadine 100 mg oral tablet: 1 tab(s) orally 2 times a day  aspirin 81 mg oral delayed release tablet: 1 tab(s) orally once a day  gabapentin 300 mg oral tablet: 1 tab(s) orally 3 times a day  Multiple Vitamins oral tablet: 1 tab(s) orally once a day  Toprol-XL 25 mg oral tablet, extended release: 1 tab(s) orally 2 times a day   Multiple Vitamins oral tablet: 1 tab(s) orally once a day

## 2024-09-05 NOTE — DISCHARGE NOTE PROVIDER - CARE PROVIDER_API CALL
Law Hawk  Cardiac Electrophysiology  [address] [place] [address], [address]  [place] [address]  Phone: [phone]  Fax: [phone]  Follow Up Time: 1 month

## 2024-09-05 NOTE — DISCHARGE NOTE PROVIDER - ATTENDING DISCHARGE PHYSICAL EXAMINATION:
I saw and evaluated the patient the day of discharge.  Admitted for evaluation of syncope. Records obtained and reviewed from [facility] and [facility], summarized as above.  Underwent cMRI here with mild MVP with mild MAD and otherwise noted epicardial LGE.  Underwent EPS and ICD implant without untoward events.  Planned for outpatient PET and genetic testing.  Stable for discharge.

## 2024-09-05 NOTE — PROGRESS NOTE ADULT - SUBJECTIVE AND OBJECTIVE BOX
Chief complaint: Patient is a 24y old  Male who presents with a chief complaint of Syncope (02 Sep 2024 16:10)    Interval history: Patient seen and examined at bedside this am.  Patient denies any acute overnight events or complaints. ICD interrogated and chest xray done. Patient unable to walk without cane.    Review of systems: A complete 10-point review of systems was obtained and is negative except as stated in the interval history.    Vitals:  ICU Vital Signs Last 24 Hrs  T(C): 36.8 (05 Sep 2024 08:20), Max: 37 (05 Sep 2024 00:27)  T(F): 98.2 (05 Sep 2024 08:20), Max: 98.6 (05 Sep 2024 00:27)  HR: 69 (05 Sep 2024 08:20) (52 - 89)  BP: 109/68 (05 Sep 2024 08:20) (94/60 - 111/60)  BP(mean): 84 (05 Sep 2024 08:20) (80 - 84)  RR: 18 (05 Sep 2024 08:20) (18 - 19)  SpO2: 98% (05 Sep 2024 08:20) (97% - 100%)    O2 Parameters below as of 05 Sep 2024 08:20  Patient On (Oxygen Delivery Method): room air        Ins & outs:     08-31 @ 07:01  -  09-01 @ 07:00  --------------------------------------------------------  IN: 440 mL / OUT: 2380 mL / NET: -1940 mL    09-01 @ 07:01 - 09-02 @ 07:00  --------------------------------------------------------  IN: 0 mL / OUT: 625 mL / NET: -625 mL    09-02 @ 07:01  -  09-03 @ 07:00  --------------------------------------------------------  IN: 0 mL / OUT: 1200 mL / NET: -1200 mL      Weight trend:  Weight (kg): 65.3 (08-30)    Physical exam:  General: No apparent distress  HEENT: Anicteric sclera. Moist mucous membranes. JVP -.   Cardiac: Regular rate and rhythm. No murmurs, rubs, or gallops.   Vascular: Symmetric radial pulses. Dorsalis pedis pulses palpable.   Respiratory: Normal effort. Clear to auscultation.   Abdomen: Soft, nontender. Audible bowel sounds.   Extremities: Warm with no edema. No cyanosis or clubbing.   Skin: Warm and dry. No rash. ICD pocket C/D/I  Neurologic: Grossly normal motor function.   Psychiatric: Oriented to person, place, and time.     Data reviewed:  - Telemetry: SR/gene 44-84bpm no events    - ECG (8/30/24):   Diagnosis Line Sinus bradycardia  Voltage criteria for left ventricular hypertrophy  ST elevation, consider early repolarization  Abnormal ECG    - TTE:  (08.30.24)  PHYSICIAN INTERPRETATION:  Left Ventricle: The left ventricular internal cavity size is normal. Left   ventricular wall thickness is normal. Global LV systolic function was   normal. Spectral Doppler shows normal pattern of LV diastolic filling.   Normal LV filling pressures.  Right Ventricle: The right ventricular size is mildly enlarged. RV   systolic function is normal.  Left Atrium: Normal left atrial size.  Right Atrium: Normal right atrial size.  Pericardium: Trivial pericardial effusion is present.  Mitral Valve: Structurally normal mitral valve, with normal leaflet   excursion. No evidence of mitral stenosis. Trace mitral valve   regurgitation is seen.  Tricuspid Valve: Structurally normal tricuspid valve, with normal leaflet   excursion. Trivial tricuspidregurgitation is visualized.  Aortic Valve: Normal trileaflet aortic valve with normal opening. No   evidence of aortic stenosis. No evidence of aortic valve regurgitation is   seen.  Pulmonic Valve: Structurally normal pulmonic valve, with normal leaflet   excursion. Trace pulmonic valve regurgitation.  Aorta: The aortic root and ascending aorta are structurally normal, with   no evidence of dilitation.  Pulmonary Artery: Normal pulmonary artery pressure.  Venous: The inferior vena cava was normal sized, with respiratory size   variation greater than 50%.  In comparison to the previous echocardiogram(s): There are no prior   studies on this patient for comparison purposes.      - Chest x-ray (8/29/24):   Impression:  No radiographic evidence of acute cardiopulmonary disease.        - Labs:              - Telemetry:  - ECG (date***):  - Echo (date***):  - Radiology:    - Labs:                        13.0   8.95  )-----------( 227      ( 05 Sep 2024 06:11 )             39.7     09-05    137  |  101  |  18  ----------------------------<  110<H>  3.9   |  25  |  0.9    Ca    9.5      05 Sep 2024 06:11  Mg     1.7     09-05        PT/INR - ( 04 Sep 2024 05:53 )   PT: 12.10 sec;   INR: 1.06 ratio         PTT - ( 04 Sep 2024 05:53 )  PTT:36.2 sec          Lactate Trend    Urinalysis Basic - ( 05 Sep 2024 06:11 )    Color: x / Appearance: x / SG: x / pH: x  Gluc: 110 mg/dL / Ketone: x  / Bili: x / Urobili: x   Blood: x / Protein: x / Nitrite: x   Leuk Esterase: x / RBC: x / WBC x   Sq Epi: x / Non Sq Epi: x / Bacteria: x          Medications:  MEDICATIONS  (STANDING):  aspirin enteric coated 81 milliGRAM(s) Oral daily  chlorhexidine 2% Cloths 1 Application(s) Topical <User Schedule>  vancomycin  IVPB 1000 milliGRAM(s) IV Intermittent every 12 hours    MEDICATIONS  (PRN):  acetaminophen     Tablet .. 650 milliGRAM(s) Oral every 6 hours PRN Moderate Pain (4 - 6)    Allergies    No Known Allergies    Intolerances

## 2024-09-05 NOTE — DISCHARGE NOTE PROVIDER - NSDCCPTREATMENT_GEN_ALL_CORE_FT
PRINCIPAL PROCEDURE  Procedure: Insertion, ICD  Findings and Treatment:   - Do not get the site wet for 5 days. After five days the bandage can be removed and the steristrips can get wet and stay on until they fall off on their own.  - Do not drive or operate heavy machinery for 1 week (BYRNES 48 hours).  - Do not submerge in water (example: baths, swimming) for 1 month.  - Do not lift your left arm greater than shoulder height for 6 weeks.  - Do not lift anything heavier than 5-10 lbs with your left arm for 6 weeks.  - Any sudden swelling, redness, fever, discharge, or severe pain, call the Electrophysiology Office at [phone].

## 2024-09-05 NOTE — PROGRESS NOTE ADULT - ASSESSMENT
Assessment:  24 year old male with FHx of father who  in his sleep at age of 33 (no autopsy or genetic testing), and PMHx of recent cardiac arrest requiring ~30 mins of chest compressions and a prolonged hospital stay at [facility] who presents with syncope. As per records, pt had a clean cath at [facility], had EP study at [place] showed WPW was ablated, and had a cardiac MRI also at the time. Pt  had another EP study reportedly as per mother. Admitted to cardiac telemetry for further work up. S/p ICD     Problems discussed and associated plan:  #Syncope, less likely seizure, concern for cardiogenic Syncope   - h/o cardiac arrest   - ECG 3024: Sinus bradycardia with LVH, high voltage QRS in the lateral precordial leads, normal QTc  - TTE 24: LVEF 62%, normal LV size and wall thickness, RV is mildly enlarged, RVH, no valvular abnormalities, no wall motion abnormalities   - On ASA 81 mg daily, unclear reason but will continue until OSH obtained  - Continue to hold off BB due to bradycardia   - MRI done  - EEG on  negative   - video EEG done  - EP and neuro are following   - Still pending cardiac MRI and EPS results from [facility]'s medical records.   - EPS with ICD placement , device normally functioning and chest xray WNL    #Muscle spasms  - Discontinue gabapentin and amantadine, which was prescribed for post-arrest muscle spasms while at [facility]  - resolved     #DVT ppx  - OOB daily   - Heparin SQ    # Hx of candida auris   - spoke with ID, will keep on contact precautions     Please contact me with any questions or concerns at [phone].

## 2024-09-05 NOTE — DISCHARGE NOTE PROVIDER - NSDCFUSCHEDAPPT_GEN_ALL_CORE_FT
[facility]  [facility] [address] [facility]  Scheduled Appointment: 10/07/2024

## 2024-09-05 NOTE — PROGRESS NOTE ADULT - PROVIDER SPECIALTY LIST ADULT
Cardiology
Cardiology
Electrophysiology
Electrophysiology
Cardiology
Electrophysiology
Electrophysiology

## 2024-09-06 ENCOUNTER — TRANSCRIPTION ENCOUNTER (OUTPATIENT)
Age: 24
End: 2024-09-06

## 2024-09-06 VITALS
SYSTOLIC BLOOD PRESSURE: 113 MMHG | TEMPERATURE: 98 F | RESPIRATION RATE: 18 BRPM | HEART RATE: 67 BPM | DIASTOLIC BLOOD PRESSURE: 68 MMHG

## 2024-09-06 LAB
ANION GAP SERPL CALC-SCNC: 9 MMOL/L — SIGNIFICANT CHANGE UP (ref 7–14)
BUN SERPL-MCNC: 16 MG/DL — SIGNIFICANT CHANGE UP (ref 10–20)
CALCIUM SERPL-MCNC: 9.6 MG/DL — SIGNIFICANT CHANGE UP (ref 8.4–10.5)
CHLORIDE SERPL-SCNC: 102 MMOL/L — SIGNIFICANT CHANGE UP (ref 98–110)
CO2 SERPL-SCNC: 26 MMOL/L — SIGNIFICANT CHANGE UP (ref 17–32)
CREAT SERPL-MCNC: 0.8 MG/DL — SIGNIFICANT CHANGE UP (ref 0.7–1.5)
EGFR: 128 ML/MIN/1.73M2 — SIGNIFICANT CHANGE UP
GLUCOSE SERPL-MCNC: 88 MG/DL — SIGNIFICANT CHANGE UP (ref 70–99)
HCT VFR BLD CALC: 38.4 % — LOW (ref 42–52)
HGB BLD-MCNC: 12.9 G/DL — LOW (ref 14–18)
MAGNESIUM SERPL-MCNC: 1.9 MG/DL — SIGNIFICANT CHANGE UP (ref 1.8–2.4)
MCHC RBC-ENTMCNC: 30.8 PG — SIGNIFICANT CHANGE UP (ref 27–31)
MCHC RBC-ENTMCNC: 33.6 G/DL — SIGNIFICANT CHANGE UP (ref 32–37)
MCV RBC AUTO: 91.6 FL — SIGNIFICANT CHANGE UP (ref 80–94)
NRBC # BLD: 0 /100 WBCS — SIGNIFICANT CHANGE UP (ref 0–0)
PLATELET # BLD AUTO: 222 K/UL — SIGNIFICANT CHANGE UP (ref 130–400)
PMV BLD: 9.1 FL — SIGNIFICANT CHANGE UP (ref 7.4–10.4)
POTASSIUM SERPL-MCNC: 4.5 MMOL/L — SIGNIFICANT CHANGE UP (ref 3.5–5)
POTASSIUM SERPL-SCNC: 4.5 MMOL/L — SIGNIFICANT CHANGE UP (ref 3.5–5)
RBC # BLD: 4.19 M/UL — LOW (ref 4.7–6.1)
RBC # FLD: 12.5 % — SIGNIFICANT CHANGE UP (ref 11.5–14.5)
SODIUM SERPL-SCNC: 137 MMOL/L — SIGNIFICANT CHANGE UP (ref 135–146)
WBC # BLD: 7.52 K/UL — SIGNIFICANT CHANGE UP (ref 4.8–10.8)
WBC # FLD AUTO: 7.52 K/UL — SIGNIFICANT CHANGE UP (ref 4.8–10.8)

## 2024-09-06 PROCEDURE — 99238 HOSP IP/OBS DSCHRG MGMT 30/<: CPT

## 2024-09-06 RX ADMIN — Medication 81 MILLIGRAM(S): at 11:41

## 2024-09-06 RX ADMIN — ACETAMINOPHEN 650 MILLIGRAM(S): 325 TABLET ORAL at 15:38

## 2024-09-06 RX ADMIN — CHLORHEXIDINE GLUCONATE 1 APPLICATION(S): 40 SOLUTION TOPICAL at 06:45

## 2024-09-06 NOTE — PHYSICAL THERAPY INITIAL EVALUATION ADULT - ADDITIONAL COMMENTS
Prior to cardiac arrest ~3mos ago, was independent, employed at [employer], living with family, actively exercising. Prior to this admission, was living at home with family x2wks ambulating independently with SC.
occasionally uses SA cane

## 2024-09-06 NOTE — PHYSICAL THERAPY INITIAL EVALUATION ADULT - RANGE OF MOTION EXAMINATION, REHAB EVAL
No acute deficits noted.
exccept left UE , surgical precautions observed./no ROM deficits were identified/deficits as listed below

## 2024-09-06 NOTE — PHYSICAL THERAPY INITIAL EVALUATION ADULT - PERTINENT HX OF CURRENT PROBLEM, REHAB EVAL
Admitted from home for syncopal work up; PMH significant for recent cardiac arrest, prolonged hospitalization, and rehabilitation- pt was home after events ~2wks prior to this current admission.
24 year old male with FHx of father who  in his sleep at age of 33 (no autopsy or genetic testing), and PMHx of recent cardiac arrest requiring ~30 mins of chest compressions and a prolonged hospital stay at [facility] who presents with syncope. As per records, pt had a clean cath at [facility], had EP study at [place] showed WPW was ablated, and had a cardiac MRI also at the time. Pt  had another EP study reportedly as per mother. Admitted to cardiac telemetry for further work up.

## 2024-09-06 NOTE — PHYSICAL THERAPY INITIAL EVALUATION ADULT - GENERAL OBSERVATIONS, REHAB EVAL
950-1030 am Chart reviewed. Pt. seen semirecline in bed, in No apparent distress, + telemetry , + AICD on left chest wall, Pt. alert and oriented X 4, agf
Chart reviewed. Pt lying in bed with mother present upon PT arrival. No c/o. Agreeable to PT eval.

## 2024-09-06 NOTE — DISCHARGE NOTE NURSING/CASE MANAGEMENT/SOCIAL WORK - PATIENT PORTAL LINK FT
You can access the FollowMyHealth Patient Portal offered by [facility] by registering at the following website: [email or web address]. By joining [employer]’s FollowMyHealth portal, you will also be able to view your health information using other applications (apps) compatible with our system.

## 2024-09-06 NOTE — PHYSICAL THERAPY INITIAL EVALUATION ADULT - PREDICTED DURATION OF THERAPY (DAYS/WKS), PT EVAL
Impression: Benign neoplasm of left choroid: D31.32. Plan: The patient has a choroidal nevus. We discussed the small risk of progression into malignant melanoma. We also discussed the importance of monitoring. PT re evaluation only , no f/u needed

## 2024-09-06 NOTE — PHYSICAL THERAPY INITIAL EVALUATION ADULT - SPECIFY REASON(S)
PT not indicated in acute setting. Rec continue ambulation with SC and nursing staff supervision. Rec begin  outpatient (cardiac) PT for monitored cardio and resistance training when medically stable.
Upon assessment pt is modified independent with transfers and ambulation using a SA cane, X 200 feet , will not require skilled PT during this admission but will benefit from outpatient PT.

## 2024-09-06 NOTE — PHYSICAL THERAPY INITIAL EVALUATION ADULT - TINETTI GAIT TEST, REHAB EVAL
Based on this exam, patient is a moderate risk to fall.
Tinetti Score=  25 /28.   Based on this test, pt is low risk for falls.

## 2024-09-06 NOTE — PHYSICAL THERAPY INITIAL EVALUATION ADULT - LIVES WITH, PROFILE
Prior to cardiac arrest ~3mos ago, was independent, employed at [employer], living with family, actively exercising. Prior to this admission, was living at home with family x2wks ambulating independently with SC.

## 2024-09-06 NOTE — PHYSICAL THERAPY INITIAL EVALUATION ADULT - LEVEL OF INDEPENDENCE: SIT/STAND, REHAB EVAL
independent
EOB; upon sitting EOB, pt dons pants without assistance, standing to pull up. sitting again to don socks, no assistance or cueing required./supervision

## 2024-09-06 NOTE — PHYSICAL THERAPY INITIAL EVALUATION ADULT - GAIT DEVIATIONS NOTED, PT EVAL
HR monitored throughout, 79-101bpm, with return to baseline withi 1min of completion of activity, no symptoms reported./decreased step length
decreased left arm sway/decreased casimiro

## 2024-09-06 NOTE — DISCHARGE NOTE NURSING/CASE MANAGEMENT/SOCIAL WORK - NSDCPEFALRISK_GEN_ALL_CORE
For information on Fall & Injury Prevention, visit: [email or web address] OR  [email or web address] OR  https://www.cdc.gov/steadi/patient.html

## 2024-09-06 NOTE — PHYSICAL THERAPY INITIAL EVALUATION ADULT - DIAGNOSIS, PT EVAL
Generalized deconditioning s/p recent cardiac arrest.
PT not indicated at this time;     Dx: Cardiac Arrest s/p AICD placement

## 2024-09-09 PROBLEM — Z00.00 ENCOUNTER FOR PREVENTIVE HEALTH EXAMINATION: Status: ACTIVE | Noted: 2024-09-09

## 2024-10-07 ENCOUNTER — APPOINTMENT (OUTPATIENT)
Dept: ELECTROPHYSIOLOGY | Facility: CLINIC | Age: 24
End: 2024-10-07
Payer: MEDICAID

## 2024-10-07 VITALS — WEIGHT: 144 LBS | HEART RATE: 68 BPM | BODY MASS INDEX: 20.62 KG/M2 | TEMPERATURE: 97.1 F | HEIGHT: 70 IN

## 2024-10-07 VITALS — DIASTOLIC BLOOD PRESSURE: 78 MMHG | SYSTOLIC BLOOD PRESSURE: 119 MMHG

## 2024-10-07 DIAGNOSIS — Z82.49 FAMILY HISTORY OF ISCHEMIC HEART DISEASE AND OTHER DISEASES OF THE CIRCULATORY SYSTEM: ICD-10-CM

## 2024-10-07 DIAGNOSIS — Z95.810 PRESENCE OF AUTOMATIC (IMPLANTABLE) CARDIAC DEFIBRILLATOR: ICD-10-CM

## 2024-10-07 DIAGNOSIS — Z87.891 PERSONAL HISTORY OF NICOTINE DEPENDENCE: ICD-10-CM

## 2024-10-07 DIAGNOSIS — I45.6 PRE-EXCITATION SYNDROME: ICD-10-CM

## 2024-10-07 DIAGNOSIS — I34.1 NONRHEUMATIC MITRAL (VALVE) PROLAPSE: ICD-10-CM

## 2024-10-07 PROCEDURE — 99024 POSTOP FOLLOW-UP VISIT: CPT

## 2025-01-06 ENCOUNTER — APPOINTMENT (OUTPATIENT)
Dept: CARDIOLOGY | Facility: CLINIC | Age: 25
End: 2025-01-06

## 2025-02-10 ENCOUNTER — APPOINTMENT (OUTPATIENT)
Dept: ELECTROPHYSIOLOGY | Facility: CLINIC | Age: 25
End: 2025-02-10

## 2025-05-12 ENCOUNTER — APPOINTMENT (OUTPATIENT)
Dept: CARDIOLOGY | Facility: CLINIC | Age: 25
End: 2025-05-12
Payer: MEDICAID

## 2025-05-12 ENCOUNTER — NON-APPOINTMENT (OUTPATIENT)
Age: 25
End: 2025-05-12

## 2025-05-12 PROCEDURE — 93296 REM INTERROG EVL PM/IDS: CPT | Mod: NC

## 2025-05-12 PROCEDURE — 93295 DEV INTERROG REMOTE 1/2/MLT: CPT

## 2025-08-12 ENCOUNTER — APPOINTMENT (OUTPATIENT)
Dept: CARDIOLOGY | Facility: CLINIC | Age: 25
End: 2025-08-12
Payer: MEDICAID

## 2025-08-12 ENCOUNTER — NON-APPOINTMENT (OUTPATIENT)
Age: 25
End: 2025-08-12

## 2025-08-12 PROCEDURE — 93295 DEV INTERROG REMOTE 1/2/MLT: CPT

## 2025-08-12 PROCEDURE — 93296 REM INTERROG EVL PM/IDS: CPT | Mod: NC
